# Patient Record
Sex: FEMALE | Race: WHITE | Employment: OTHER | URBAN - METROPOLITAN AREA
[De-identification: names, ages, dates, MRNs, and addresses within clinical notes are randomized per-mention and may not be internally consistent; named-entity substitution may affect disease eponyms.]

---

## 2019-04-06 ENCOUNTER — APPOINTMENT (OUTPATIENT)
Dept: GENERAL RADIOLOGY | Age: 74
End: 2019-04-06
Attending: EMERGENCY MEDICINE
Payer: MEDICARE

## 2019-04-06 ENCOUNTER — HOSPITAL ENCOUNTER (EMERGENCY)
Age: 74
Discharge: HOME | End: 2019-04-06
Attending: EMERGENCY MEDICINE
Payer: MEDICARE

## 2019-04-06 VITALS
SYSTOLIC BLOOD PRESSURE: 116 MMHG | TEMPERATURE: 98.3 F | WEIGHT: 165 LBS | DIASTOLIC BLOOD PRESSURE: 99 MMHG | OXYGEN SATURATION: 98 % | HEIGHT: 62 IN | BODY MASS INDEX: 30.36 KG/M2 | HEART RATE: 104 BPM | RESPIRATION RATE: 16 BRPM

## 2019-04-06 DIAGNOSIS — J45.909 MODERATE ASTHMA, UNSPECIFIED WHETHER COMPLICATED, UNSPECIFIED WHETHER PERSISTENT: Primary | ICD-10-CM

## 2019-04-06 LAB
ALBUMIN SERPL-MCNC: 3.6 G/DL (ref 3.4–5)
ALBUMIN/GLOB SERPL: 1.1 {RATIO} (ref 0.8–1.7)
ALP SERPL-CCNC: 97 U/L (ref 45–117)
ALT SERPL-CCNC: 26 U/L (ref 13–56)
ANION GAP SERPL CALC-SCNC: 8 MMOL/L (ref 3–18)
AST SERPL-CCNC: 19 U/L (ref 15–37)
BASOPHILS # BLD: 0 K/UL (ref 0–0.1)
BASOPHILS NFR BLD: 1 % (ref 0–2)
BILIRUB SERPL-MCNC: 0.5 MG/DL (ref 0.2–1)
BUN SERPL-MCNC: 15 MG/DL (ref 7–18)
BUN/CREAT SERPL: 13 (ref 12–20)
CALCIUM SERPL-MCNC: 8.8 MG/DL (ref 8.5–10.1)
CHLORIDE SERPL-SCNC: 109 MMOL/L (ref 100–108)
CK MB CFR SERPL CALC: NORMAL % (ref 0–4)
CK MB SERPL-MCNC: <1 NG/ML (ref 5–25)
CK SERPL-CCNC: 34 U/L (ref 26–192)
CO2 SERPL-SCNC: 24 MMOL/L (ref 21–32)
CREAT SERPL-MCNC: 1.12 MG/DL (ref 0.6–1.3)
DIFFERENTIAL METHOD BLD: ABNORMAL
EOSINOPHIL # BLD: 0.1 K/UL (ref 0–0.4)
EOSINOPHIL NFR BLD: 2 % (ref 0–5)
ERYTHROCYTE [DISTWIDTH] IN BLOOD BY AUTOMATED COUNT: 13.5 % (ref 11.6–14.5)
GLOBULIN SER CALC-MCNC: 3.4 G/DL (ref 2–4)
GLUCOSE SERPL-MCNC: 95 MG/DL (ref 74–99)
HCT VFR BLD AUTO: 42.4 % (ref 35–45)
HGB BLD-MCNC: 13.9 G/DL (ref 12–16)
LYMPHOCYTES # BLD: 1.7 K/UL (ref 0.9–3.6)
LYMPHOCYTES NFR BLD: 45 % (ref 21–52)
MCH RBC QN AUTO: 29.8 PG (ref 24–34)
MCHC RBC AUTO-ENTMCNC: 32.8 G/DL (ref 31–37)
MCV RBC AUTO: 91 FL (ref 74–97)
MONOCYTES # BLD: 0.2 K/UL (ref 0.05–1.2)
MONOCYTES NFR BLD: 4 % (ref 3–10)
NEUTS SEG # BLD: 1.9 K/UL (ref 1.8–8)
NEUTS SEG NFR BLD: 48 % (ref 40–73)
PLATELET # BLD AUTO: 157 K/UL (ref 135–420)
PMV BLD AUTO: 9.8 FL (ref 9.2–11.8)
POTASSIUM SERPL-SCNC: 3.7 MMOL/L (ref 3.5–5.5)
PROT SERPL-MCNC: 7 G/DL (ref 6.4–8.2)
RBC # BLD AUTO: 4.66 M/UL (ref 4.2–5.3)
SODIUM SERPL-SCNC: 141 MMOL/L (ref 136–145)
TROPONIN I SERPL-MCNC: <0.02 NG/ML (ref 0–0.04)
WBC # BLD AUTO: 3.8 K/UL (ref 4.6–13.2)

## 2019-04-06 PROCEDURE — 74011250637 HC RX REV CODE- 250/637: Performed by: EMERGENCY MEDICINE

## 2019-04-06 PROCEDURE — 94640 AIRWAY INHALATION TREATMENT: CPT

## 2019-04-06 PROCEDURE — 74011000250 HC RX REV CODE- 250: Performed by: EMERGENCY MEDICINE

## 2019-04-06 PROCEDURE — 80053 COMPREHEN METABOLIC PANEL: CPT

## 2019-04-06 PROCEDURE — 99285 EMERGENCY DEPT VISIT HI MDM: CPT

## 2019-04-06 PROCEDURE — 93005 ELECTROCARDIOGRAM TRACING: CPT

## 2019-04-06 PROCEDURE — 74011636637 HC RX REV CODE- 636/637: Performed by: EMERGENCY MEDICINE

## 2019-04-06 PROCEDURE — 85025 COMPLETE CBC W/AUTO DIFF WBC: CPT

## 2019-04-06 PROCEDURE — 77030013140 HC MSK NEB VYRM -A

## 2019-04-06 PROCEDURE — 82550 ASSAY OF CK (CPK): CPT

## 2019-04-06 PROCEDURE — 71045 X-RAY EXAM CHEST 1 VIEW: CPT

## 2019-04-06 RX ORDER — ALPRAZOLAM 1 MG/1
1 TABLET ORAL
Status: ON HOLD | COMMUNITY
End: 2021-06-28

## 2019-04-06 RX ORDER — ALBUTEROL SULFATE 90 UG/1
2 AEROSOL, METERED RESPIRATORY (INHALATION)
Qty: 1 INHALER | Refills: 0 | Status: SHIPPED | OUTPATIENT
Start: 2019-04-06 | End: 2019-04-11

## 2019-04-06 RX ORDER — PREDNISONE 50 MG/1
50 TABLET ORAL DAILY
Qty: 3 TAB | Refills: 0 | Status: SHIPPED | OUTPATIENT
Start: 2019-04-06 | End: 2019-04-09

## 2019-04-06 RX ORDER — LORAZEPAM 1 MG/1
1 TABLET ORAL ONCE
Status: COMPLETED | OUTPATIENT
Start: 2019-04-06 | End: 2019-04-06

## 2019-04-06 RX ORDER — ALBUTEROL SULFATE 90 UG/1
2 AEROSOL, METERED RESPIRATORY (INHALATION)
COMMUNITY
End: 2019-04-06

## 2019-04-06 RX ORDER — AMITRIPTYLINE HYDROCHLORIDE 100 MG/1
100 TABLET, FILM COATED ORAL
Status: ON HOLD | COMMUNITY
End: 2021-06-28

## 2019-04-06 RX ORDER — HYDROCODONE BITARTRATE AND ACETAMINOPHEN 5; 325 MG/1; MG/1
1 TABLET ORAL
Status: COMPLETED | OUTPATIENT
Start: 2019-04-06 | End: 2019-04-06

## 2019-04-06 RX ORDER — LORAZEPAM 1 MG/1
1 TABLET ORAL SEE ADMIN INSTRUCTIONS
Status: DISCONTINUED | OUTPATIENT
Start: 2019-04-06 | End: 2019-04-06

## 2019-04-06 RX ORDER — IPRATROPIUM BROMIDE AND ALBUTEROL SULFATE 2.5; .5 MG/3ML; MG/3ML
3 SOLUTION RESPIRATORY (INHALATION)
Status: COMPLETED | OUTPATIENT
Start: 2019-04-06 | End: 2019-04-06

## 2019-04-06 RX ADMIN — IPRATROPIUM BROMIDE AND ALBUTEROL SULFATE 3 ML: .5; 3 SOLUTION RESPIRATORY (INHALATION) at 12:02

## 2019-04-06 RX ADMIN — IPRATROPIUM BROMIDE AND ALBUTEROL SULFATE 3 ML: .5; 3 SOLUTION RESPIRATORY (INHALATION) at 12:09

## 2019-04-06 RX ADMIN — HYDROCODONE BITARTRATE AND ACETAMINOPHEN 1 TABLET: 5; 325 TABLET ORAL at 16:41

## 2019-04-06 RX ADMIN — PREDNISONE 50 MG: 20 TABLET ORAL at 16:42

## 2019-04-06 RX ADMIN — LORAZEPAM 1 MG: 1 TABLET ORAL at 16:51

## 2019-04-06 NOTE — ED NOTES
Pt states feeling better, family at bedside, call bell within reach, side rails up, no needs expressed at this time

## 2019-04-06 NOTE — ED NOTES
Pt states she is breathing easier, family states they have not heard any wheezing. Pt resting on stretcher, call bell within reach, side rails up, no needs expressed at this time

## 2019-04-06 NOTE — ED NOTES
Pt resting on stretcher, call bell within reach, side rails up, no needs expressed at this time, family remains at bedside.

## 2019-04-06 NOTE — DISCHARGE INSTRUCTIONS
Patient Education     Learning About Asthma Triggers  What are triggers? When you have asthma, certain things can make your symptoms worse. These are called triggers. They include:  · Cigarette smoke or air pollution. · Things you are allergic to, such as:  ¨ Pollen, mold, or dust mites. ¨ Pet hair, skin, or saliva. · Illnesses, like colds, flu, or pneumonia. · Exercise. · Dry, cold air. How do triggers affect asthma? Triggers can make it harder for your lungs to work as they should and can lead to sudden difficulty breathing and other symptoms. When you are around a trigger, an asthma attack is more likely. If your symptoms are severe, you may need emergency treatment or have to go to the hospital for treatment. If you know what your triggers are and can avoid them, you may be able to prevent asthma attacks, reduce how often you have them, and make them less severe. What can you do to avoid triggers? The first thing is to know your triggers. When you are having symptoms, note the things around you that might be causing them. Then look for patterns in what may be triggering your symptoms. Record your triggers on a piece of paper or in an asthma diary. When you have your list of possible triggers, work with your doctor to find ways to avoid them. You also can check how well your lungs are working by measuring your peak expiratory flow (PEF) throughout the day. Your PEF may drop when you are near things that trigger symptoms. Here are some ways to avoid a few common triggers. · Do not smoke or allow others to smoke around you. If you need help quitting, talk to your doctor about stop-smoking programs and medicines. These can increase your chances of quitting for good. · If there is a lot of pollution, pollen, or dust outside, stay at home and keep your windows closed. Use an air conditioner or air filter in your home.  Check your local weather report or newspaper for air quality and pollen reports. · Get a flu shot every year. Talk to your doctor about getting a pneumococcal shot. Wash your hands often to prevent infections. · Avoid exercising outdoors in cold weather. If you are outdoors in cold weather, wear a scarf around your face and breathe through your nose. How can you manage an asthma attack? · If you have an asthma action plan, follow the plan. In general:  ¨ Use your quick-relief inhaler as directed by your doctor. If your symptoms do not get better after you use your medicine, have someone take you to the emergency room. Call an ambulance if needed. ¨ If your doctor has given you other inhaled medicines or steroid pills, take them as directed. Where can you learn more? Go to Discomixdownload.com.be  Enter M564 in the search box to learn more about \"Learning About Asthma Triggers. \"   © 1879-7104 [employer]. Care instructions adapted under license by [facility] (which disclaims liability or warranty for this information). This care instruction is for use with your licensed healthcare professional. If you have questions about a medical condition or this instruction, always ask your healthcare professional. [place] [address] any warranty or liability for your use of this information. Content Version: 58.4.939839;  Last Revised: February 23, 2012

## 2019-04-06 NOTE — ED NOTES
Pt resting on stretcher, call bell within reach, family at bedside, no needs expressed at this time. Pt feeling better, talking with family

## 2019-04-06 NOTE — LETTER
[facility] [place] 
[facility] 
[place] [id number] 
[phone] Work/School Note Date: 4/6/2019 To Whom It May concern: 
 
Angelita Nunn was seen and treated today in the emergency room by the following provider(s): 
Attending Provider: Justine Lock MD.   
 
Darek Del Castillo was here with pt during entire stay in ED Sincerely, 
 
 
 
 
Linda Robertson RN

## 2019-04-06 NOTE — ED NOTES
Pt discharged per ambulatory with granddaughter, no acute distress on discharge, written isnt given to pt and granddaughter with rx x 2, verbalizes understanding Patient armband removed and shredded

## 2019-04-06 NOTE — ED TRIAGE NOTES
Pt arrived by EMS for sob, pt coughing on arrival, pt states she couldn't get a deep breath in, pt has hx of anxiety and appears anxious on arrival. Pt given neb tx en route, with little results. Pt has hx of asthma

## 2019-04-06 NOTE — ED PROVIDER NOTES
EMERGENCY DEPARTMENT HISTORY AND PHYSICAL EXAM 
 
Date: 4/6/2019 Patient Name: Leatha Downs History of Presenting Illness Chief Complaint Patient presents with  Cough History Provided By: Patient Leatha Downs is a 72 y.o. female with PMHX of asthma and seasonal allergies who presents to the emergency department C/O acute shortness of breath. Patient [place] come [place] she notes that the allergies have brought on her asthma. Today she had acute onset of shortness of breath and heaviness in her chest which she described as a tightness. EMS arrived and treated her with a DuoNeb which greatly reduced her symptoms. When patient arrived she was speaking in full sentences she notes that her symptoms have greatly improved but tightness is now a 2 out of 10 she denies headache, changes in vision, nausea, vomiting, diarrhea, urinary symptoms, numbness or tingling in hands or feet, fevers, chills. PCP: Leticia Gallegos MD 
 
Current Outpatient Medications Medication Sig Dispense Refill  ALPRAZolam (XANAX) 1 mg tablet Take 1 mg by mouth three (3) times daily as needed for Anxiety.  amitriptyline (ELAVIL) 100 mg tablet Take 100 mg by mouth nightly.  predniSONE (DELTASONE) 50 mg tablet Take 1 Tab by mouth daily for 3 days. 3 Tab 0  
 albuterol (PROVENTIL HFA, VENTOLIN HFA, PROAIR HFA) 90 mcg/actuation inhaler Take 2 Puffs by inhalation every four (4) hours as needed for Wheezing or Shortness of Breath for up to 5 days. Indications: Asthma Attack 1 Inhaler 0 Past History Past Medical History: 
Past Medical History:  
Diagnosis Date  Anxiety  Arthritis  Asthma   
 allergy related  Cancer ([facility] [address] lumpectomy lt breast  
 Depression  Stroke ([facility] [address] Past Surgical History: 
Past Surgical History:  
Procedure Laterality Date  BREAST SURGERY PROCEDURE UNLISTED    
 lumpectomy lt breast  
 HX APPENDECTOMY  HX CHOLECYSTECTOMY  HX DILATION AND CURETTAGE    
 HX GYN    
 HX HEENT    
 all of teeth removed  HX HYSTERECTOMY  HX ORTHOPAEDIC    
 total rt knee replacement  HX TUBAL LIGATION    
 HX WISDOM TEETH EXTRACTION Family History: 
History reviewed. No pertinent family history. Social History: 
Social History Tobacco Use  Smoking status: Former Smoker  Smokeless tobacco: Never Used Substance Use Topics  Alcohol use: Never Frequency: Never  Drug use: Never Allergies: Allergies Allergen Reactions  Iodinated Contrast- Oral And Iv Dye Anaphylaxis  Dilantin [Phenytoin Sodium Extended] Hives Review of Systems Review of Systems All other systems reviewed and otherwise negative except as noted in the HPI Physical Exam  
 
Vitals:  
 04/06/19 1630 04/06/19 1700 04/06/19 1730 04/06/19 1749 BP: (!) 123/101 119/76 (!) 116/99 (!) 116/99 Pulse: 98 92 93 (!) 104 Resp: 18 17 15 16 Temp:      
SpO2:    98% Weight:      
Height:      
 
Physical Exam 
 
Nursing notes and vital signs reviewed Constitutional: Non toxic appearing, no acute distress Head: Normocephalic, Atraumatic Eyes: Pupils are equal, round, and reactive to light, EOMI Neck: Supple Cardiovascular: Regular rate and rhythm, Chest: Normal work of breathing  and chest excursion bilaterally Lungs: Course and wheezing to ausculation bilaterally Abdomen: Soft, non tender, non distended Back: No evidence of trauma or deformity Extremities: No evidence of trauma or deformity, no LE edema Skin: Warm and dry, normal cap refill Neuro: Alert and appropriate Psychiatric: Normal mood and affect Diagnostic Study Results Labs - No results found for this or any previous visit (from the past 12 hour(s)). Radiologic Studies -  
XR CHEST PORT Final Result IMPRESSION:  
  
No active cardiopulmonary disease. CT Results  (Last 48 hours) None CXR Results  (Last 48 hours) 04/06/19 1139  XR CHEST PORT Final result Impression:  IMPRESSION:  
   
No active cardiopulmonary disease. Narrative:  EXAM: CHEST RADIOGRAPH, SINGLE VIEW CLINICAL INDICATION/HISTORY: cough COMPARISON: None. TECHNIQUE: Portable frontal view of the chest was obtained.   
   
_______________ FINDINGS:  
   
SUPPORT DEVICES: None. HEART AND MEDIASTINUM: Cardiomediastinal silhouette appears within normal  
limits. Tortuous and atherosclerotic thoracic aorta. No central vascular  
congestion. LUNGS AND PLEURAL SPACES: Lungs are well aerated with no confluent airspace  
opacity. No pleural effusion or pneumothorax. BONY THORAX AND SOFT TISSUES: No acute osseous abnormality. _______________ Medications given in the ED- Medications  
albuterol-ipratropium (DUO-NEB) 2.5 MG-0.5 MG/3 ML (3 mL Nebulization Given 4/6/19 1209) predniSONE (DELTASONE) tablet 50 mg (50 mg Oral Given 4/6/19 1642) HYDROcodone-acetaminophen (NORCO) 5-325 mg per tablet 1 Tab (1 Tab Oral Given 4/6/19 1641) LORazepam (ATIVAN) tablet 1 mg (1 mg Oral Given 4/6/19 1651) Medical Decision Making I am the first provider for this patient. I reviewed the vital signs, available nursing notes, past medical history, past surgical history, family history and social history. Vital Signs-Reviewed the patient's vital signs. Pulse Oximetry Analysis - 98% on ra Cardiac Monitor: 
Rate: 97 bpm 
Rhythm: nsr 
 
EKG interpretation: (Preliminary) EKG read by Gunner Grove MD 
 at 1642 No stemi normal sinus rhythm Records Reviewed: Nursing Notes, Old Medical Records and Previous electrocardiograms Provider Notes (Medical Decision Making): Adrienne Arshad is a 72 y.o. female with history of asthma who presented today with shortness of breath.   Patient was brought back and evaluated she noted that she had marked improvement with first DuoNeb therefore she was treated with 2 more lung sounds much less wheezy and no longer course. Patient breathing easily on room air. EKG showed no evidence of STEMI x-ray showed no acute cardiopulmonary process laboratory work as above notable for a negative troponin. Patient was treated with steroids. Patient did miss her daily medication of Ativan and Norco after which a slight tremor was no longer present in her hand. Patient will be discharged home with an inhaler and steroids follow-up with her PCM I explained the plan to the patient and family members and they agreed and understood. Procedures: 
Procedures Diagnosis and Disposition DISCHARGE NOTE: 
 
Angelita Nunn's  results have been reviewed with her. She has been counseled regarding her diagnosis, treatment, and plan. She verbally conveys understanding and agreement of the signs, symptoms, diagnosis, treatment and prognosis and additionally agrees to follow up as discussed. She also agrees with the care-plan and conveys that all of her questions have been answered. I have also provided discharge instructions for her that include: educational information regarding their diagnosis and treatment, and list of reasons why they would want to return to the ED prior to their follow-up appointment, should her condition change. She has been provided with education for proper emergency department utilization. CLINICAL IMPRESSION: 
 
1. Moderate asthma, unspecified whether complicated, unspecified whether persistent PLAN: 
1. D/C Home 2. Discharge Medication List as of 4/6/2019  5:27 PM  
  
START taking these medications Details  
predniSONE (DELTASONE) 50 mg tablet Take 1 Tab by mouth daily for 3 days. , Print, Disp-3 Tab, R-0  
  
  
CONTINUE these medications which have CHANGED  Details  
albuterol (PROVENTIL HFA, VENTOLIN HFA, PROAIR HFA) 90 mcg/actuation inhaler Take 2 Puffs by inhalation every four (4) hours as needed for Wheezing or Shortness of Breath for up to 5 days. Indications: Asthma Attack, Print, Disp-1 Inhaler, R-0  
  
  
CONTINUE these medications which have NOT CHANGED Details ALPRAZolam (XANAX) 1 mg tablet Take 1 mg by mouth three (3) times daily as needed for Anxiety. , Historical Med  
  
amitriptyline (ELAVIL) 100 mg tablet Take 100 mg by mouth nightly., Historical Med 3. Follow-up Information Follow up With Specialties Details Why Contact Info [facility] EMERGENCY DEPT Emergency Medicine  As needed [address] [id number] 
[phone]  
  
 
_______________________________ Please note that this dictation was completed with [email or web address], the computer voice recognition software. Quite often unanticipated grammatical, syntax, homophones, and other interpretive errors are inadvertently transcribed by the computer software. Please disregard these errors. Please excuse any errors that have escaped final proofreading.

## 2019-05-12 LAB
ATRIAL RATE: 95 BPM
CALCULATED P AXIS, ECG09: 63 DEGREES
CALCULATED R AXIS, ECG10: 13 DEGREES
CALCULATED T AXIS, ECG11: 53 DEGREES
DIAGNOSIS, 93000: NORMAL
P-R INTERVAL, ECG05: 180 MS
Q-T INTERVAL, ECG07: 392 MS
QRS DURATION, ECG06: 88 MS
QTC CALCULATION (BEZET), ECG08: 492 MS
VENTRICULAR RATE, ECG03: 95 BPM

## 2019-05-31 ENCOUNTER — APPOINTMENT (OUTPATIENT)
Dept: CT IMAGING | Age: 74
End: 2019-05-31
Attending: NURSE PRACTITIONER
Payer: MEDICARE

## 2019-05-31 ENCOUNTER — HOSPITAL ENCOUNTER (EMERGENCY)
Dept: CT IMAGING | Age: 74
Discharge: HOME | End: 2019-05-31
Attending: NURSE PRACTITIONER
Payer: MEDICARE

## 2019-05-31 ENCOUNTER — HOSPITAL ENCOUNTER (EMERGENCY)
Age: 74
Discharge: HOME | End: 2019-05-31
Attending: EMERGENCY MEDICINE | Admitting: EMERGENCY MEDICINE
Payer: MEDICARE

## 2019-05-31 ENCOUNTER — APPOINTMENT (OUTPATIENT)
Dept: GENERAL RADIOLOGY | Age: 74
End: 2019-05-31
Attending: NURSE PRACTITIONER
Payer: MEDICARE

## 2019-05-31 VITALS
HEIGHT: 62 IN | BODY MASS INDEX: 30.91 KG/M2 | DIASTOLIC BLOOD PRESSURE: 70 MMHG | SYSTOLIC BLOOD PRESSURE: 132 MMHG | WEIGHT: 168 LBS | HEART RATE: 82 BPM | TEMPERATURE: 98.3 F | RESPIRATION RATE: 18 BRPM | OXYGEN SATURATION: 100 %

## 2019-05-31 DIAGNOSIS — R05.9 COUGH: ICD-10-CM

## 2019-05-31 DIAGNOSIS — S09.90XA CLOSED HEAD INJURY, INITIAL ENCOUNTER: ICD-10-CM

## 2019-05-31 DIAGNOSIS — R51.9 ACUTE NONINTRACTABLE HEADACHE, UNSPECIFIED HEADACHE TYPE: ICD-10-CM
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
## 2019-05-31 PROCEDURE — 99283 EMERGENCY DEPT VISIT LOW MDM: CPT

## 2019-05-31 PROCEDURE — 70486 CT MAXILLOFACIAL W/O DYE: CPT

## 2019-05-31 PROCEDURE — 71046 X-RAY EXAM CHEST 2 VIEWS: CPT

## 2019-05-31 PROCEDURE — 72125 CT NECK SPINE W/O DYE: CPT

## 2019-05-31 PROCEDURE — 70450 CT HEAD/BRAIN W/O DYE: CPT

## 2019-05-31 PROCEDURE — 74011250637 HC RX REV CODE- 250/637: Performed by: NURSE PRACTITIONER

## 2019-05-31 RX ORDER — ACETAMINOPHEN 325 MG/1
650 TABLET ORAL
Status: COMPLETED | OUTPATIENT
Start: 2019-05-31 | End: 2019-05-31

## 2019-05-31 RX ADMIN — ACETAMINOPHEN 650 MG: 325 TABLET ORAL at 20:10

## 2019-05-31 NOTE — DISCHARGE INSTRUCTIONS
Follow-up as directed  Take Tylenol as needed for pain  Return to emergency department for severe headache, visual changes, neck pain, numbness or tingling, vomiting or worsening of symptoms    Patient Education        Cough: Care Instructions  Your Care Instructions    A cough is your body's response to something that bothers your throat or airways. Many things can cause a cough. You might cough because of a cold or the flu, bronchitis, or asthma. Smoking, postnasal drip, allergies, and stomach acid that backs up into your throat also can cause coughs. A cough is a symptom, not a disease. Most coughs stop when the cause, such as a cold, goes away. You can take a few steps at home to cough less and feel better. Follow-up care is a key part of your treatment and safety. Be sure to make and go to all appointments, and call your doctor if you are having problems. It's also a good idea to know your test results and keep a list of the medicines you take. How can you care for yourself at home? · Drink lots of water and other fluids. This helps thin the mucus and soothes a dry or sore throat. Honey or lemon juice in hot water or tea may ease a dry cough. · Take cough medicine as directed by your doctor. · Prop up your head on pillows to help you breathe and ease a dry cough. · Try cough drops to soothe a dry or sore throat. Cough drops don't stop a cough. Medicine-flavored cough drops are no better than candy-flavored drops or hard candy. · Do not smoke. Avoid secondhand smoke. If you need help quitting, talk to your doctor about stop-smoking programs and medicines. These can increase your chances of quitting for good. When should you call for help? Call 911 anytime you think you may need emergency care.  For example, call if:    · You have severe trouble breathing.    Call your doctor now or seek immediate medical care if:    · You cough up blood.     · You have new or worse trouble breathing.     · You have a new or higher fever.     · You have a new rash.    Watch closely for changes in your health, and be sure to contact your doctor if:    · You cough more deeply or more often, especially if you notice more mucus or a change in the color of your mucus.     · You have new symptoms, such as a sore throat, an earache, or sinus pain.     · You do not get better as expected. Where can you learn more? Go to http://carina-stacia.info/. Enter D279 in the search box to learn more about \"Cough: Care Instructions. \"  Current as of: September 5, 2018  Content Version: 11.9  © 0980-2330 [employer]. Care instructions adapted under license by [employer] (which disclaims liability or warranty for this information). If you have questions about a medical condition or this instruction, always ask your healthcare professional. [address] any warranty or liability for your use of this information.

## 2019-05-31 NOTE — ED PROVIDER NOTES
EMERGENCY DEPARTMENT HISTORY AND PHYSICAL EXAM    Date: 5/31/2019  Patient Name: Luis Fink    History of Presenting Illness     Chief Complaint   Patient presents with    Fall    Head Injury         History Provided By: Patient    Additional History (Context):   6:19 PM  Angelita Nunn is a [facility] y.o. female with PMHX anxiety, arthritis, asthma, cancer, depression, stroke presents to the ED c/o today after falling last night. The patient reports that she excellently tripped over a dog toy causing her to fall onto her left side of her head hitting the side of the couch. She denies any loss of consciousness but states that she was very dizzy afterwards. She states throughout the day her headache is gotten worse and is now an 8 out of 10 that is started on her left temporal and radiates the top of her head. She did not take any medications prior to arrival.  She does report last year she had a pretty severe concussion where she fractured multiple bones on the left side of her face. She also reports neck pain in the center but no radicular symptoms. Patient also reports that she has had a productive cough for the past couple weeks that she cannot get rid of. Pt denies current visual changes, numbness or tingling, chest pain, blood thinner use, fever, shortness of breath, vomiting, nausea, and any other sxs or complaints. PCP: Leticia Gallegos MD    Current Outpatient Medications   Medication Sig Dispense Refill    ALPRAZolam (XANAX) 1 mg tablet Take 1 mg by mouth three (3) times daily as needed for Anxiety.  amitriptyline (ELAVIL) 100 mg tablet Take 100 mg by mouth nightly.          Past History     Past Medical History:  Past Medical History:   Diagnosis Date    Anxiety     Arthritis     Asthma     allergy related    Cancer ([facility] [address]     lumpectomy lt breast    Depression     Stroke [facility])        Past Surgical History:  Past Surgical History:   Procedure Laterality Date    BREAST SURGERY PROCEDURE UNLISTED      lumpectomy lt breast    HX APPENDECTOMY      HX CHOLECYSTECTOMY      HX DILATION AND CURETTAGE      HX GYN      HX HEENT      all of teeth removed     HX HYSTERECTOMY      HX ORTHOPAEDIC      total rt knee replacement    HX TUBAL LIGATION      HX WISDOM TEETH EXTRACTION         Family History:  History reviewed. No pertinent family history. Social History:  Social History     Tobacco Use    Smoking status: Former Smoker    Smokeless tobacco: Never Used   Substance Use Topics    Alcohol use: Never     Frequency: Never    Drug use: Never       Allergies: Allergies   Allergen Reactions    Iodinated Contrast- Oral And Iv Dye Anaphylaxis    Dilantin [Phenytoin Sodium Extended] Hives       Review of Systems     Review of Systems   Constitutional: Negative for chills and fever. HENT: Negative for facial swelling. Respiratory: Positive for cough. Negative for chest tightness and shortness of breath. Cardiovascular: Negative for chest pain. Gastrointestinal: Negative for abdominal pain, diarrhea, nausea and vomiting. Genitourinary: Negative for dysuria. Musculoskeletal: Positive for neck pain. Skin: Negative for wound. Neurological: Positive for dizziness and headaches. Hematological: Does not bruise/bleed easily. All other systems reviewed and are negative. Physical Exam     Vitals:    05/31/19 1808 05/31/19 2017   BP: 110/69 132/70   Pulse: 89 82   Resp: 20 18   Temp: 98.8 °F (37.1 °C) 98.3 °F (36.8 °C)   SpO2: 100% 100%   Weight: 76.2 kg (168 lb)    Height: 5' 2\" (1.575 m)      Physical Exam   Constitutional: She is oriented to person, place, and time. She appears well-developed and well-nourished. Answering questions appropriately. HENT:   Head: Normocephalic and atraumatic.    Mild tenderness palpation left ocular without swelling or obvious deformity, able to perform EOMs without difficulty, no open area   Eyes: Pupils are equal, round, and reactive to light. Conjunctivae and EOM are normal.   Neck: Normal range of motion. Neck supple. Mild midline cervical tenderness palpation without step-off or deformity, able to perform range of motion without difficulty   Cardiovascular: Normal rate and regular rhythm. No murmur heard. Pulmonary/Chest: Effort normal and breath sounds normal.   Abdominal: Soft. Bowel sounds are normal. There is no tenderness. There is no rebound and no guarding. Musculoskeletal: Normal range of motion. Strong equal strength in bilateral upper and lower extremities. Denies paresthesia. Negative upper and lower pronator drift. No facial droop noted. Negative finger to nose. Neurological: She is alert and oriented to person, place, and time. No cranial nerve deficit. Coordination normal.   Skin: Skin is warm and dry. Nursing note and vitals reviewed. Diagnostic Study Results     Labs:   No results found for this or any previous visit (from the past 12 hour(s)). Radiologic Studies:     6:19 PM  RADIOLOGY FINDINGS      XR CHEST PA LAT   Final Result   IMPRESSION:      No acute cardiopulmonary process. CT SPINE CERV WO CONT   Final Result   IMPRESSION:      Mild degenerative changes without evidence of acute fracture or traumatic   subluxation of the cervical spine. Note that this cervical spine CT was performed supine. Although it is highly   sensitive for fractures, it does not evaluate for ligamentous injury or   instability. If the patient has persistent symptoms or if otherwise clinically   indicated, erect plain film evaluation or MRI should be performed. CT MAXILLOFACIAL WO CONT   Final Result   IMPRESSION:      1. No acute intracranial process, specifically, no evidence of intracranial   hemorrhage, mass effect, or midline shift. 2. No evidence of maxillofacial fracture. 3. Senescent changes of the brain including findings most suggestive of chronic   microvascular ischemia.       Please note that CT may be negative in the setting of acute infarction. CT HEAD WO CONT   Final Result   IMPRESSION:      1. No acute intracranial process, specifically, no evidence of intracranial   hemorrhage, mass effect, or midline shift. 2. No evidence of maxillofacial fracture. 3. Senescent changes of the brain including findings most suggestive of chronic   microvascular ischemia. Please note that CT may be negative in the setting of acute infarction. CT Results  (Last 48 hours)               05/31/19 1932  CT SPINE CERV WO CONT Final result    Impression:  IMPRESSION:       Mild degenerative changes without evidence of acute fracture or traumatic   subluxation of the cervical spine. Note that this cervical spine CT was performed supine. Although it is highly   sensitive for fractures, it does not evaluate for ligamentous injury or   instability. If the patient has persistent symptoms or if otherwise clinically   indicated, erect plain film evaluation or MRI should be performed. Narrative:  EXAM: CT cervical spine       CLINICAL INDICATION/HISTORY:  Neck pain following trauma. COMPARISON: None. TECHNIQUE: Axial CT imaging of the cervical spine was performed from the skull   base to the thoracic inlet without intravenous contrast. Multiplanar reformats   were generated. One or more dose reduction techniques were used on this CT: automated exposure   control, adjustment of the mAs and/or kVp according to patient size, and   iterative reconstruction techniques. The specific techniques used on this CT   exam have been documented in the patient's electronic medical record. Digital   Imaging and Communications in Medicine (DICOM) format image data are available   to nonaffiliated external healthcare facilities or entities on a secure, media   free, reciprocally searchable basis with patient authorization for at least a   12-month period after this study.        _______________ FINDINGS:       VERTEBRAE AND DISCS: There is no acute fracture in the cervical spine,   specifically, vertebral body heights are maintained. Mild spondylosis at C4-C5   There is no spondylolisthesis. There are no significant areas of bone lucency or   sclerosis. SPINAL CANAL AND FORAMINA: Patent without significant bony canal or foramina   encroachment. PREVERTEBRAL SOFT TISSUES: Normal.       VISIBLE PORTIONS OF POSTERIOR FOSSA/BRAIN: Normal.       LUNG APICES: Clear. OTHER: None.       _______________           05/31/19 1932  CT MAXILLOFACIAL WO CONT Final result    Impression:  IMPRESSION:       1. No acute intracranial process, specifically, no evidence of intracranial   hemorrhage, mass effect, or midline shift. 2. No evidence of maxillofacial fracture. 3. Senescent changes of the brain including findings most suggestive of chronic   microvascular ischemia. Please note that CT may be negative in the setting of acute infarction. Narrative:  EXAM:    CT - Head   CT - Maxillofacial       CLINICAL INDICATION/HISTORY: Left eye pain and headache following trauma. COMPARISON: None. TECHNIQUE: Axial CT imaging of the head was performed without intravenous   contrast. Additionally, thin section axial CT through the maxillofacial   structures was performed with coronal and sagittal reconstructions. One or more dose reduction techniques were used on this CT: automated exposure   control, adjustment of the mAs and/or kVp according to patient size, and   iterative reconstruction techniques. The specific techniques used on this CT   exam have been documented in the patient's electronic medical record.   Digital   Imaging and Communications in Medicine (DICOM) format image data are available   to nonaffiliated external healthcare facilities or entities on a secure, media   free, reciprocally searchable basis with patient authorization for at least a   12-month period after this study. _______________       FINDINGS:       BRAIN AND POSTERIOR FOSSA: There is no intracranial hemorrhage, mass effect, or   midline shift. There is hypoattenuation of the periventricular white matter,   which is nonspecific but most likely represents changes from chronic   microangiopathy. Gray-white differentiation is maintained. EXTRA-AXIAL SPACES AND MENINGES: There are no abnormal extra-axial fluid   collections. CALVARIUM: Intact. SINUSES: Clear. MAXILLOFACIAL STRUCTURES:  No fractures are evident. Patient is edentulous. The   mandible is intact. There is no dislocation at the temporomandibular joints. The   globes are intact.     _______________           05/31/19 1931  CT HEAD WO CONT Final result    Impression:  IMPRESSION:       1. No acute intracranial process, specifically, no evidence of intracranial   hemorrhage, mass effect, or midline shift. 2. No evidence of maxillofacial fracture. 3. Senescent changes of the brain including findings most suggestive of chronic   microvascular ischemia. Please note that CT may be negative in the setting of acute infarction. Narrative:  EXAM:    CT - Head   CT - Maxillofacial       CLINICAL INDICATION/HISTORY: Left eye pain and headache following trauma. COMPARISON: None. TECHNIQUE: Axial CT imaging of the head was performed without intravenous   contrast. Additionally, thin section axial CT through the maxillofacial   structures was performed with coronal and sagittal reconstructions. One or more dose reduction techniques were used on this CT: automated exposure   control, adjustment of the mAs and/or kVp according to patient size, and   iterative reconstruction techniques. The specific techniques used on this CT   exam have been documented in the patient's electronic medical record.   Digital   Imaging and Communications in Medicine (DICOM) format image data are available   to nonaffiliated external healthcare facilities or entities on a secure, media   free, reciprocally searchable basis with patient authorization for at least a   12-month period after this study. _______________       FINDINGS:       BRAIN AND POSTERIOR FOSSA: There is no intracranial hemorrhage, mass effect, or   midline shift. There is hypoattenuation of the periventricular white matter,   which is nonspecific but most likely represents changes from chronic   microangiopathy. Gray-white differentiation is maintained. EXTRA-AXIAL SPACES AND MENINGES: There are no abnormal extra-axial fluid   collections. CALVARIUM: Intact. SINUSES: Clear. MAXILLOFACIAL STRUCTURES:  No fractures are evident. Patient is edentulous. The   mandible is intact. There is no dislocation at the temporomandibular joints. The   globes are intact.     _______________               CXR Results  (Last 48 hours)               05/31/19 1940  XR CHEST PA LAT Final result    Impression:  IMPRESSION:       No acute cardiopulmonary process. Narrative:  EXAM: 2 view chest x-ray       CLINICAL INDICATION/HISTORY: Cough. COMPARISON: 04/06/2019. TECHNIQUE: Frontal and lateral views of the chest were obtained.       _______________       FINDINGS:       HEART, VESSELS, MEDIASTINUM: Heart size is normal. No vascular congestion. LUNGS, PLEURAL SPACES: The lungs are clear. No effusion or pneumothorax. BONY THORAX, SOFT TISSUES: Unremarkable.       _______________                 Medical Decision Making     I am the first provider for this patient. I reviewed the vital signs, available nursing notes, past medical history, past surgical history, family history and social history. Vital Signs: Reviewed the patient's vital signs.     Pulse Oximetry Analysis: 100% on RA    Records Reviewed: REVIEWED OLD RECORDS AND NURSING NOTES    Procedures:  Procedures    ED Course:  6:19 PM: Initial Contact     7:52 PM: Patient updated on all results, they understand reasons to return and are offering no further questions or concerns at this time. Provider Notes (Medical Decision Making): Patient presents emergency department complaining of head and neck pain after mechanical fall yesterday. She denies loss of consciousness but states that her headache is becoming worse. The patient is not on any blood thinners. CT of the head neck and maxillofacial show no acute process. Will discharge with Tylenol for pain as well as strict return precautions early PCP and neurology follow-up as needed. She has no neuro focal deficit on exam.  She Denies any radicular symptoms. Pt understands reasons to return and is offering no questions or concerns. Diagnosis and Disposition     Discharge Note:  7:52 PM:  Angelita Nunn's  results have been reviewed with her. She has been counseled regarding her diagnosis, treatment, and plan. She verbally conveys understanding and agreement of the signs, symptoms, diagnosis, treatment and prognosis and additionally agrees to follow up as discussed. She also agrees with the care-plan and conveys that all of her questions have been answered. I have also provided discharge instructions for her that include: educational information regarding their diagnosis and treatment, and list of reasons why they would want to return to the ED prior to their follow-up appointment, should her condition change. She has been provided with education for proper emergency department utilization. Clinical Impression:  1. Fall, initial encounter    2. Closed head injury, initial encounter    3. Acute nonintractable headache, unspecified headache type    4. Cough        Plan:  1. D/C Home  2. Discharge Medication List as of 5/31/2019  7:51 PM        3.    Follow-up Information     Follow up With Specialties Details Why Contact Info    Carmen Farias MD Internal Medicine Schedule an appointment as soon as possible for a visit in 2 days or your PCP [address] [facility] [address]      Rosa Isela Marquez MD Neurology Schedule an appointment as soon as possible for a visit in 1 week  [address]  [address] [facility] [address]  [phone] [address] [facility] DEPT Emergency Medicine  As needed, If symptoms worsen [address] [place] [id number]  [phone]          Please note that this dictation was completed with [employer], the [email or web address] voice recognition software. Quite often unanticipated grammatical, syntax, homophones, and other interpretive errors are inadvertently transcribed by the computer software. Please disregard these errors. Please excuse any errors that have escaped final proofreading.     BRANDON Kinsey-BC

## 2019-05-31 NOTE — ED TRIAGE NOTES
Patient states that she fell last night hitting her head on the edge of her couch. Patient with complaints of left sided head pain that radiates to the top of head.

## 2019-06-02 ENCOUNTER — APPOINTMENT (OUTPATIENT)
Dept: VASCULAR SURGERY | Age: 74
End: 2019-06-02
Attending: EMERGENCY MEDICINE
Payer: MEDICARE

## 2019-06-02 ENCOUNTER — HOSPITAL ENCOUNTER (EMERGENCY)
Age: 74
Discharge: HOME | End: 2019-06-02
Attending: EMERGENCY MEDICINE
Payer: MEDICARE

## 2019-06-02 VITALS
TEMPERATURE: 98.7 F | DIASTOLIC BLOOD PRESSURE: 59 MMHG | RESPIRATION RATE: 16 BRPM | WEIGHT: 172 LBS | HEART RATE: 81 BPM | OXYGEN SATURATION: 100 % | BODY MASS INDEX: 31.65 KG/M2 | SYSTOLIC BLOOD PRESSURE: 119 MMHG | HEIGHT: 62 IN

## 2019-06-02 DIAGNOSIS — R60.0 BILATERAL LEG EDEMA: Primary | ICD-10-CM

## 2019-06-02 DIAGNOSIS — N30.00 ACUTE CYSTITIS WITHOUT HEMATURIA: ICD-10-CM

## 2019-06-02 LAB
APPEARANCE UR: CLEAR
BACTERIA URNS QL MICRO: ABNORMAL /HPF
BILIRUB UR QL: NEGATIVE
COLOR UR: YELLOW
EPITH CASTS URNS QL MICRO: ABNORMAL /LPF (ref 0–5)
GLUCOSE UR STRIP.AUTO-MCNC: NEGATIVE MG/DL
HGB UR QL STRIP: NEGATIVE
KETONES UR QL STRIP.AUTO: ABNORMAL MG/DL
LEUKOCYTE ESTERASE UR QL STRIP.AUTO: ABNORMAL
MUCOUS THREADS URNS QL MICRO: ABNORMAL /LPF
NITRITE UR QL STRIP.AUTO: NEGATIVE
PH UR STRIP: 5 [PH] (ref 5–8)
PROT UR STRIP-MCNC: NEGATIVE MG/DL
RBC #/AREA URNS HPF: NEGATIVE /HPF (ref 0–5)
SP GR UR REFRACTOMETRY: 1.03 (ref 1–1.03)
UROBILINOGEN UR QL STRIP.AUTO: 0.2 EU/DL (ref 0.2–1)
WBC URNS QL MICRO: ABNORMAL /HPF (ref 0–5)

## 2019-06-02 PROCEDURE — 81001 URINALYSIS AUTO W/SCOPE: CPT

## 2019-06-02 PROCEDURE — 87086 URINE CULTURE/COLONY COUNT: CPT

## 2019-06-02 PROCEDURE — 93970 EXTREMITY STUDY: CPT

## 2019-06-02 PROCEDURE — 99283 EMERGENCY DEPT VISIT LOW MDM: CPT

## 2019-06-02 RX ORDER — CEPHALEXIN 500 MG/1
500 CAPSULE ORAL 2 TIMES DAILY
Qty: 14 CAP | Refills: 0 | Status: SHIPPED | OUTPATIENT
Start: 2019-06-02 | End: 2019-06-09

## 2019-06-02 RX ORDER — ESCITALOPRAM OXALATE 20 MG/1
20 TABLET ORAL DAILY
COMMUNITY

## 2019-06-02 RX ORDER — GABAPENTIN 300 MG/1
400 CAPSULE ORAL 3 TIMES DAILY
COMMUNITY

## 2019-06-02 RX ORDER — BUPROPION HYDROCHLORIDE 150 MG/1
150 TABLET ORAL
Status: ON HOLD | COMMUNITY
End: 2021-06-28

## 2019-06-02 NOTE — ED PROVIDER NOTES
EMERGENCY DEPARTMENT HISTORY AND PHYSICAL EXAM    Date: 6/2/2019  Patient Name: Chava Nunn    History of Presenting Illness     Chief Complaint   Patient presents with    Rash         History Provided By: Patient    5:13 PM  Angelita Nunn is a 72 y.o. female who presents to the emergency department C/O lower extremity edema, burning pain, and outpouching that she noticed today when she was in a dressing room at a retail store. She states she only notices the outpouching when she stands up and has never seen it before today. She states the burning pain is also new today. She denies chest pain or shortness of breath. She does endorse decreasing urine output is also and is also concerned she might have a UTI. No other complaints. PCP: Leticia Gallegos MD    Current Outpatient Medications   Medication Sig Dispense Refill    escitalopram oxalate (LEXAPRO) 20 mg tablet Take 20 mg by mouth daily.  buPROPion XL (WELLBUTRIN XL) 150 mg tablet Take 150 mg by mouth every morning.  gabapentin (NEURONTIN) 300 mg capsule Take 400 mg by mouth three (3) times daily.  cephALEXin (KEFLEX) 500 mg capsule Take 1 Cap by mouth two (2) times a day for 7 days. 14 Cap 0    ALPRAZolam (XANAX) 1 mg tablet Take 1 mg by mouth three (3) times daily as needed for Anxiety.  amitriptyline (ELAVIL) 100 mg tablet Take 100 mg by mouth nightly.          Past History     Past Medical History:  Past Medical History:   Diagnosis Date    Anxiety     Arthritis     Asthma     allergy related    Cancer ([facility] [address]     lumpectomy lt breast    Depression     Stroke [facility])        Past Surgical History:  Past Surgical History:   Procedure Laterality Date    BREAST SURGERY PROCEDURE UNLISTED      lumpectomy lt breast    HX APPENDECTOMY      HX CHOLECYSTECTOMY      HX DILATION AND CURETTAGE      HX GYN      HX HEENT      all of teeth removed     HX HYSTERECTOMY      HX ORTHOPAEDIC      total rt knee replacement    HX TUBAL LIGATION      HX WISDOM TEETH EXTRACTION         Family History:  No family history on file. Social History:  Social History     Tobacco Use    Smoking status: Former Smoker    Smokeless tobacco: Never Used   Substance Use Topics    Alcohol use: Never     Frequency: Never    Drug use: Never       Allergies: Allergies   Allergen Reactions    Iodinated Contrast- Oral And Iv Dye Anaphylaxis    Dilantin [Phenytoin Sodium Extended] Hives         Review of Systems   Review of Systems   Constitutional: Negative for fever. Respiratory: Negative for shortness of breath. Cardiovascular: Positive for leg swelling. Negative for chest pain. Genitourinary: Positive for decreased urine volume. All other systems reviewed and are negative.         Physical Exam     Vitals:    06/02/19 1658   BP: (!) 124/93   Pulse: 98   Resp: 16   Temp: 98.7 °F (37.1 °C)   SpO2: 95%   Weight: 78 kg (172 lb)   Height: 5' 2\" (1.575 m)     Physical Exam    Nursing notes and vital signs reviewed    Constitutional: Non toxic appearing, no acute distress  Head: Normocephalic, Atraumatic  Eyes: EOMI  Neck: Supple  Chest: Normal work of breathing and chest excursion bilaterally  Back: No evidence of trauma or deformity  Extremities: No evidence of trauma or deformity, moderate bilateral LE edema  Skin: Warm and dry, normal cap refill  Neuro: Alert and appropriate  Psychiatric: Anxious mood and affect      Diagnostic Study Results     Labs -     Recent Results (from the past 12 hour(s))   URINALYSIS W/ RFLX MICROSCOPIC    Collection Time: 06/02/19  5:30 PM   Result Value Ref Range    Color YELLOW      Appearance CLEAR      Specific gravity 1.027 1.005 - 1.030      pH (UA) 5.0 5.0 - 8.0      Protein NEGATIVE  NEG mg/dL    Glucose NEGATIVE  NEG mg/dL    Ketone TRACE (A) NEG mg/dL    Bilirubin NEGATIVE  NEG      Blood NEGATIVE  NEG      Urobilinogen 0.2 0.2 - 1.0 EU/dL    Nitrites NEGATIVE  NEG      Leukocyte Esterase MODERATE (A) NEG URINE MICROSCOPIC ONLY    Collection Time: 06/02/19  5:30 PM   Result Value Ref Range    WBC 21 to 30 0 - 5 /hpf    RBC NEGATIVE  0 - 5 /hpf    Epithelial cells 1+ 0 - 5 /lpf    Bacteria 1+ (A) NEG /hpf    Mucus 1+ (A) NEG /lpf       Radiologic Studies -   No orders to display     CT Results  (Last 48 hours)               05/31/19 1932  CT SPINE CERV WO CONT Final result    Impression:  IMPRESSION:       Mild degenerative changes without evidence of acute fracture or traumatic   subluxation of the cervical spine. Note that this cervical spine CT was performed supine. Although it is highly   sensitive for fractures, it does not evaluate for ligamentous injury or   instability. If the patient has persistent symptoms or if otherwise clinically   indicated, erect plain film evaluation or MRI should be performed. Narrative:  EXAM: CT cervical spine       CLINICAL INDICATION/HISTORY:  Neck pain following trauma. COMPARISON: None. TECHNIQUE: Axial CT imaging of the cervical spine was performed from the skull   base to the thoracic inlet without intravenous contrast. Multiplanar reformats   were generated. One or more dose reduction techniques were used on this CT: automated exposure   control, adjustment of the mAs and/or kVp according to patient size, and   iterative reconstruction techniques. The specific techniques used on this CT   exam have been documented in the patient's electronic medical record. Digital   Imaging and Communications in Medicine (DICOM) format image data are available   to nonaffiliated external healthcare facilities or entities on a secure, media   free, reciprocally searchable basis with patient authorization for at least a   12-month period after this study. _______________       FINDINGS:       VERTEBRAE AND DISCS: There is no acute fracture in the cervical spine,   specifically, vertebral body heights are maintained.  Mild spondylosis at C4-C5   There is no spondylolisthesis. There are no significant areas of bone lucency or   sclerosis. SPINAL CANAL AND FORAMINA: Patent without significant bony canal or foramina   encroachment. PREVERTEBRAL SOFT TISSUES: Normal.       VISIBLE PORTIONS OF POSTERIOR FOSSA/BRAIN: Normal.       LUNG APICES: Clear. OTHER: None.       _______________           05/31/19 1932  CT MAXILLOFACIAL WO CONT Final result    Impression:  IMPRESSION:       1. No acute intracranial process, specifically, no evidence of intracranial   hemorrhage, mass effect, or midline shift. 2. No evidence of maxillofacial fracture. 3. Senescent changes of the brain including findings most suggestive of chronic   microvascular ischemia. Please note that CT may be negative in the setting of acute infarction. Narrative:  EXAM:    CT - Head   CT - Maxillofacial       CLINICAL INDICATION/HISTORY: Left eye pain and headache following trauma. COMPARISON: None. TECHNIQUE: Axial CT imaging of the head was performed without intravenous   contrast. Additionally, thin section axial CT through the maxillofacial   structures was performed with coronal and sagittal reconstructions. One or more dose reduction techniques were used on this CT: automated exposure   control, adjustment of the mAs and/or kVp according to patient size, and   iterative reconstruction techniques. The specific techniques used on this CT   exam have been documented in the patient's electronic medical record. Digital   Imaging and Communications in Medicine (DICOM) format image data are available   to nonaffiliated external healthcare facilities or entities on a secure, media   free, reciprocally searchable basis with patient authorization for at least a   12-month period after this study. _______________       FINDINGS:       BRAIN AND POSTERIOR FOSSA: There is no intracranial hemorrhage, mass effect, or   midline shift.  There is hypoattenuation of the periventricular white matter,   which is nonspecific but most likely represents changes from chronic   microangiopathy. Gray-white differentiation is maintained. EXTRA-AXIAL SPACES AND MENINGES: There are no abnormal extra-axial fluid   collections. CALVARIUM: Intact. SINUSES: Clear. MAXILLOFACIAL STRUCTURES:  No fractures are evident. Patient is edentulous. The   mandible is intact. There is no dislocation at the temporomandibular joints. The   globes are intact.     _______________           05/31/19 1931  CT HEAD WO CONT Final result    Impression:  IMPRESSION:       1. No acute intracranial process, specifically, no evidence of intracranial   hemorrhage, mass effect, or midline shift. 2. No evidence of maxillofacial fracture. 3. Senescent changes of the brain including findings most suggestive of chronic   microvascular ischemia. Please note that CT may be negative in the setting of acute infarction. Narrative:  EXAM:    CT - Head   CT - Maxillofacial       CLINICAL INDICATION/HISTORY: Left eye pain and headache following trauma. COMPARISON: None. TECHNIQUE: Axial CT imaging of the head was performed without intravenous   contrast. Additionally, thin section axial CT through the maxillofacial   structures was performed with coronal and sagittal reconstructions. One or more dose reduction techniques were used on this CT: automated exposure   control, adjustment of the mAs and/or kVp according to patient size, and   iterative reconstruction techniques. The specific techniques used on this CT   exam have been documented in the patient's electronic medical record. Digital   Imaging and Communications in Medicine (DICOM) format image data are available   to nonaffiliated external healthcare facilities or entities on a secure, media   free, reciprocally searchable basis with patient authorization for at least a   12-month period after this study. _______________       FINDINGS:       BRAIN AND POSTERIOR FOSSA: There is no intracranial hemorrhage, mass effect, or   midline shift. There is hypoattenuation of the periventricular white matter,   which is nonspecific but most likely represents changes from chronic   microangiopathy. Gray-white differentiation is maintained. EXTRA-AXIAL SPACES AND MENINGES: There are no abnormal extra-axial fluid   collections. CALVARIUM: Intact. SINUSES: Clear. MAXILLOFACIAL STRUCTURES:  No fractures are evident. Patient is edentulous. The   mandible is intact. There is no dislocation at the temporomandibular joints. The   globes are intact.     _______________               CXR Results  (Last 48 hours)               05/31/19 1940  XR CHEST PA LAT Final result    Impression:  IMPRESSION:       No acute cardiopulmonary process. Narrative:  EXAM: 2 view chest x-ray       CLINICAL INDICATION/HISTORY: Cough. COMPARISON: 04/06/2019. TECHNIQUE: Frontal and lateral views of the chest were obtained.       _______________       FINDINGS:       HEART, VESSELS, MEDIASTINUM: Heart size is normal. No vascular congestion. LUNGS, PLEURAL SPACES: The lungs are clear. No effusion or pneumothorax. BONY THORAX, SOFT TISSUES: Unremarkable.       _______________                 Medications given in the ED-  Medications - No data to display      Medical Decision Making   I am the first provider for this patient. I reviewed the vital signs, available nursing notes, past medical history, past surgical history, family history and social history. Vital Signs-Reviewed the patient's vital signs. Records Reviewed: Nursing Notes and Old Medical Records    Provider Notes (Medical Decision Making): Joss Garvin is a 72 y.o. female presenting for burning pain and outpouching on her legs. Exam consistent with likely cellulitis, no DVT on Doppler. UTI on UA, culture sent.   Will provide antibiotics and discharge with PCP follow-up and return precautions. Patient understands and agrees with this plan. Procedures:  Procedures    ED Course:   6:41 PM  Updated on all results and plan and all questions answered      Diagnosis and Disposition       DISCHARGE NOTE:    Angelita Nunn's  results have been reviewed with her. She has been counseled regarding her diagnosis, treatment, and plan. She verbally conveys understanding and agreement of the signs, symptoms, diagnosis, treatment and prognosis and additionally agrees to follow up as discussed. She also agrees with the care-plan and conveys that all of her questions have been answered. I have also provided discharge instructions for her that include: educational information regarding their diagnosis and treatment, and list of reasons why they would want to return to the ED prior to their follow-up appointment, should her condition change. She has been provided with education for proper emergency department utilization. CLINICAL IMPRESSION:    1. Bilateral leg edema    2. Acute cystitis without hematuria        PLAN:  1. D/C Home  2. Current Discharge Medication List      START taking these medications    Details   cephALEXin (KEFLEX) 500 mg capsule Take 1 Cap by mouth two (2) times a day for 7 days. Qty: 14 Cap, Refills: 0           3. Follow-up Information     Follow up With Specialties Details Why Contact Info    Yessy Elkins, DO Internal Medicine Schedule an appointment as soon as possible for a visit Or Your Primary Care Doctor [address] [facility]      THE [facility] EMERGENCY DEPT Emergency Medicine  If symptoms worsen [address] [id number]  [phone]        _______________________________      Please note that this dictation was completed with [employer], the [employer] voice recognition software.   Quite often unanticipated grammatical, syntax, homophones, and other interpretive errors are inadvertently transcribed by the computer software. Please disregard these errors. Please excuse any errors that have escaped final proofreading.

## 2019-06-02 NOTE — DISCHARGE INSTRUCTIONS
Patient Education        Leg and Ankle Edema: Care Instructions  Your Care Instructions  Swelling in the legs, ankles, and feet is called edema. It is common after you sit or stand for a while. Long plane flights or car rides often cause swelling in the legs and feet. You may also have swelling if you have to stand for long periods of time at your job. Problems with the veins in the legs (varicose veins) and changes in hormones can also cause swelling. Sometimes the swelling in the ankles and feet is caused by a more serious problem, such as heart failure, infection, blood clots, or liver or kidney disease. Follow-up care is a key part of your treatment and safety. Be sure to make and go to all appointments, and call your doctor if you are having problems. It's also a good idea to know your test results and keep a list of the medicines you take. How can you care for yourself at home? · If your doctor gave you medicine, take it as prescribed. Call your doctor if you think you are having a problem with your medicine. · Whenever you are resting, raise your legs up. Try to keep the swollen area higher than the level of your heart. · Take breaks from standing or sitting in one position. ? Walk around to increase the blood flow in your lower legs. ? Move your feet and ankles often while you stand, or tighten and relax your leg muscles. · Wear support stockings. Put them on in the morning, before swelling gets worse. · Eat a balanced diet. Lose weight if you need to. · Limit the amount of salt (sodium) in your diet. Salt holds fluid in the body and may increase swelling. When should you call for help? Call 911 anytime you think you may need emergency care. For example, call if:    · You have symptoms of a blood clot in your lung (called a pulmonary embolism). These may include:  ? Sudden chest pain. ? Trouble breathing. ?  Coughing up blood.    Call your doctor now or seek immediate medical care if:    · You have signs of a blood clot, such as:  ? Pain in your calf, back of the knee, thigh, or groin. ? Redness and swelling in your leg or groin.     · You have symptoms of infection, such as:  ? Increased pain, swelling, warmth, or redness. ? Red streaks or pus. ? A fever.    Watch closely for changes in your health, and be sure to contact your doctor if:    · Your swelling is getting worse.     · You have new or worsening pain in your legs.     · You do not get better as expected. Where can you learn more? Go to http://carina-stacia.info/. Enter D164 in the search box to learn more about \"Leg and Ankle Edema: Care Instructions. \"  Current as of: September 23, 2018  Content Version: 11.9  © 7988-1342 [employer]. Care instructions adapted under license by [employer] (which disclaims liability or warranty for this information). If you have questions about a medical condition or this instruction, always ask your healthcare professional. [place] [address] any warranty or liability for your use of this information. Patient Education        Urinary Tract Infection in Women: Care Instructions  Your Care Instructions    A urinary tract infection, or UTI, is a general term for an infection anywhere between the kidneys and the urethra (where urine comes out). Most UTIs are bladder infections. They often cause pain or burning when you urinate. UTIs are caused by bacteria and can be cured with antibiotics. Be sure to complete your treatment so that the infection goes away. Follow-up care is a key part of your treatment and safety. Be sure to make and go to all appointments, and call your doctor if you are having problems. It's also a good idea to know your test results and keep a list of the medicines you take. How can you care for yourself at home? · Take your antibiotics as directed. Do not stop taking them just because you feel better.  You need to take the full course of antibiotics. · Drink extra water and other fluids for the next day or two. This may help wash out the bacteria that are causing the infection. (If you have kidney, heart, or liver disease and have to limit fluids, talk with your doctor before you increase your fluid intake.)  · Avoid drinks that are carbonated or have caffeine. They can irritate the bladder. · Urinate often. Try to empty your bladder each time. · To relieve pain, take a hot bath or lay a heating pad set on low over your lower belly or genital area. Never go to sleep with a heating pad in place. To prevent UTIs  · Drink plenty of water each day. This helps you urinate often, which clears bacteria from your system. (If you have kidney, heart, or liver disease and have to limit fluids, talk with your doctor before you increase your fluid intake.)  · Urinate when you need to. · Urinate right after you have sex. · Change sanitary pads often. · Avoid douches, bubble baths, feminine hygiene sprays, and other feminine hygiene products that have deodorants. · After going to the bathroom, wipe from front to back. When should you call for help? Call your doctor now or seek immediate medical care if:    · Symptoms such as fever, chills, nausea, or vomiting get worse or appear for the first time.     · You have new pain in your back just below your rib cage. This is called flank pain.     · There is new blood or pus in your urine.     · You have any problems with your antibiotic medicine.    Watch closely for changes in your health, and be sure to contact your doctor if:    · You are not getting better after taking an antibiotic for 2 days.     · Your symptoms go away but then come back. Where can you learn more? Go to http://carina-stacia.info/. Enter K584 in the search box to learn more about \"Urinary Tract Infection in Women: Care Instructions. \"  Current as of: March 20, 2018  Content Version: 11.9  © 4736-1074 [employer]. Care instructions adapted under license by [employer] (which disclaims liability or warranty for this information). If you have questions about a medical condition or this instruction, always ask your healthcare professional. [place] [address] any warranty or liability for your use of this information.

## 2019-06-02 NOTE — ED TRIAGE NOTES
Pt states she felt her legs burning today, pt reports when she looked at her thighs she noticed what appears to be \"pockets of fluid\",

## 2019-06-02 NOTE — ED NOTES
Patient states she started having neuropathy like pains at approximately 1300. Patient states she feels like there is a \"TENS unit on her and turned up as high as it can go. \" Patient also c/o \"pockets\" in her legs that feel like they're burning. No pockets or rash noted on inspection, although indentations are noted in the skin of the thighs bilaterally when patient stands.

## 2019-06-04 LAB
BACTERIA SPEC CULT: NORMAL
SERVICE CMNT-IMP: NORMAL

## 2020-03-01 ENCOUNTER — APPOINTMENT (OUTPATIENT)
Dept: GENERAL RADIOLOGY | Age: 75
End: 2020-03-01
Attending: EMERGENCY MEDICINE
Payer: MEDICARE

## 2020-03-01 ENCOUNTER — APPOINTMENT (OUTPATIENT)
Dept: CT IMAGING | Age: 75
End: 2020-03-01
Attending: EMERGENCY MEDICINE
Payer: MEDICARE

## 2020-03-01 ENCOUNTER — HOSPITAL ENCOUNTER (EMERGENCY)
Age: 75
Discharge: HOME | End: 2020-03-01
Attending: EMERGENCY MEDICINE
Payer: MEDICARE

## 2020-03-01 VITALS
HEART RATE: 100 BPM | RESPIRATION RATE: 18 BRPM | DIASTOLIC BLOOD PRESSURE: 93 MMHG | HEIGHT: 62 IN | OXYGEN SATURATION: 100 % | WEIGHT: 273 LBS | BODY MASS INDEX: 50.24 KG/M2 | TEMPERATURE: 97.4 F | SYSTOLIC BLOOD PRESSURE: 166 MMHG

## 2020-03-01 DIAGNOSIS — F03.90 DEMENTIA WITHOUT BEHAVIORAL DISTURBANCE, UNSPECIFIED DEMENTIA TYPE: Primary | ICD-10-CM

## 2020-03-01 LAB
ALBUMIN SERPL-MCNC: 3.7 G/DL (ref 3.4–5)
ALBUMIN/GLOB SERPL: 1.1 {RATIO} (ref 0.8–1.7)
ALP SERPL-CCNC: 102 U/L (ref 45–117)
ALT SERPL-CCNC: 27 U/L (ref 13–56)
AMMONIA PLAS-SCNC: <10 UMOL/L (ref 11–32)
AMPHET UR QL SCN: NEGATIVE
ANION GAP SERPL CALC-SCNC: 7 MMOL/L (ref 3–18)
APPEARANCE UR: CLEAR
APTT PPP: 28.6 SEC (ref 23–36.4)
AST SERPL-CCNC: 19 U/L (ref 10–38)
ATRIAL RATE: 75 BPM
BARBITURATES UR QL SCN: NEGATIVE
BASOPHILS # BLD: 0 K/UL (ref 0–0.1)
BASOPHILS NFR BLD: 1 % (ref 0–2)
BENZODIAZ UR QL: NEGATIVE
BILIRUB SERPL-MCNC: 0.3 MG/DL (ref 0.2–1)
BILIRUB UR QL: NEGATIVE
BUN SERPL-MCNC: 13 MG/DL (ref 7–18)
BUN/CREAT SERPL: 14 (ref 12–20)
CALCIUM SERPL-MCNC: 9 MG/DL (ref 8.5–10.1)
CALCULATED P AXIS, ECG09: 45 DEGREES
CALCULATED R AXIS, ECG10: -5 DEGREES
CALCULATED T AXIS, ECG11: 22 DEGREES
CANNABINOIDS UR QL SCN: NEGATIVE
CHLORIDE SERPL-SCNC: 107 MMOL/L (ref 100–111)
CK MB CFR SERPL CALC: 4.6 % (ref 0–4)
CK MB SERPL-MCNC: 3.3 NG/ML (ref 5–25)
CK SERPL-CCNC: 71 U/L (ref 26–192)
CO2 SERPL-SCNC: 28 MMOL/L (ref 21–32)
COCAINE UR QL SCN: NEGATIVE
COLOR UR: YELLOW
CREAT SERPL-MCNC: 0.95 MG/DL (ref 0.6–1.3)
DIAGNOSIS, 93000: NORMAL
DIFFERENTIAL METHOD BLD: NORMAL
EOSINOPHIL # BLD: 0.1 K/UL (ref 0–0.4)
EOSINOPHIL NFR BLD: 1 % (ref 0–5)
ERYTHROCYTE [DISTWIDTH] IN BLOOD BY AUTOMATED COUNT: 13 % (ref 11.6–14.5)
GLOBULIN SER CALC-MCNC: 3.4 G/DL (ref 2–4)
GLUCOSE SERPL-MCNC: 99 MG/DL (ref 74–99)
GLUCOSE UR STRIP.AUTO-MCNC: NEGATIVE MG/DL
HCT VFR BLD AUTO: 43.6 % (ref 35–45)
HDSCOM,HDSCOM: NORMAL
HGB BLD-MCNC: 14.4 G/DL (ref 12–16)
HGB UR QL STRIP: NEGATIVE
INR PPP: 1 (ref 0.8–1.2)
KETONES UR QL STRIP.AUTO: ABNORMAL MG/DL
LEUKOCYTE ESTERASE UR QL STRIP.AUTO: NEGATIVE
LIPASE SERPL-CCNC: 58 U/L (ref 73–393)
LYMPHOCYTES # BLD: 1.3 K/UL (ref 0.9–3.6)
LYMPHOCYTES NFR BLD: 26 % (ref 21–52)
MAGNESIUM SERPL-MCNC: 2.3 MG/DL (ref 1.6–2.6)
MCH RBC QN AUTO: 30.1 PG (ref 24–34)
MCHC RBC AUTO-ENTMCNC: 33 G/DL (ref 31–37)
MCV RBC AUTO: 91.2 FL (ref 74–97)
METHADONE UR QL: NEGATIVE
MONOCYTES # BLD: 0.4 K/UL (ref 0.05–1.2)
MONOCYTES NFR BLD: 8 % (ref 3–10)
NEUTS SEG # BLD: 3.2 K/UL (ref 1.8–8)
NEUTS SEG NFR BLD: 64 % (ref 40–73)
NITRITE UR QL STRIP.AUTO: NEGATIVE
OPIATES UR QL: NEGATIVE
P-R INTERVAL, ECG05: 178 MS
PCP UR QL: NEGATIVE
PH UR STRIP: 8 [PH] (ref 5–8)
PLATELET # BLD AUTO: 194 K/UL (ref 135–420)
PMV BLD AUTO: 9.9 FL (ref 9.2–11.8)
POTASSIUM SERPL-SCNC: 3.9 MMOL/L (ref 3.5–5.5)
PROT SERPL-MCNC: 7.1 G/DL (ref 6.4–8.2)
PROT UR STRIP-MCNC: NEGATIVE MG/DL
PROTHROMBIN TIME: 13.3 SEC (ref 11.5–15.2)
Q-T INTERVAL, ECG07: 418 MS
QRS DURATION, ECG06: 86 MS
QTC CALCULATION (BEZET), ECG08: 466 MS
RBC # BLD AUTO: 4.78 M/UL (ref 4.2–5.3)
SODIUM SERPL-SCNC: 142 MMOL/L (ref 136–145)
SP GR UR REFRACTOMETRY: 1.01 (ref 1–1.03)
TROPONIN I SERPL-MCNC: <0.02 NG/ML (ref 0–0.04)
UROBILINOGEN UR QL STRIP.AUTO: 0.2 EU/DL (ref 0.2–1)
VENTRICULAR RATE, ECG03: 75 BPM
WBC # BLD AUTO: 4.9 K/UL (ref 4.6–13.2)

## 2020-03-01 PROCEDURE — 80053 COMPREHEN METABOLIC PANEL: CPT

## 2020-03-01 PROCEDURE — 70450 CT HEAD/BRAIN W/O DYE: CPT

## 2020-03-01 PROCEDURE — 83735 ASSAY OF MAGNESIUM: CPT

## 2020-03-01 PROCEDURE — 85610 PROTHROMBIN TIME: CPT

## 2020-03-01 PROCEDURE — 99285 EMERGENCY DEPT VISIT HI MDM: CPT

## 2020-03-01 PROCEDURE — 74011250636 HC RX REV CODE- 250/636: Performed by: EMERGENCY MEDICINE

## 2020-03-01 PROCEDURE — 83690 ASSAY OF LIPASE: CPT

## 2020-03-01 PROCEDURE — 71045 X-RAY EXAM CHEST 1 VIEW: CPT

## 2020-03-01 PROCEDURE — 93005 ELECTROCARDIOGRAM TRACING: CPT

## 2020-03-01 PROCEDURE — 80307 DRUG TEST PRSMV CHEM ANLYZR: CPT

## 2020-03-01 PROCEDURE — 85025 COMPLETE CBC W/AUTO DIFF WBC: CPT

## 2020-03-01 PROCEDURE — 82140 ASSAY OF AMMONIA: CPT

## 2020-03-01 PROCEDURE — 85730 THROMBOPLASTIN TIME PARTIAL: CPT

## 2020-03-01 PROCEDURE — 81003 URINALYSIS AUTO W/O SCOPE: CPT

## 2020-03-01 PROCEDURE — 82550 ASSAY OF CK (CPK): CPT

## 2020-03-01 RX ADMIN — SODIUM CHLORIDE 500 ML: 900 INJECTION, SOLUTION INTRAVENOUS at 14:26

## 2020-03-01 NOTE — ED PROVIDER NOTES
EMERGENCY DEPARTMENT HISTORY AND PHYSICAL EXAM    Date: 3/1/2020  Patient Name: Giselle Watts    History of Presenting Illness     Chief Complaint   Patient presents with    Anxiety         History Provided By: Patient     History Filippo Wise):   1:30 PM  Angelita Nunn is a 69 y.o. female with PMHX of Anxiety, arthritis, asthma, cancer (status post lumpectomy of the left breast), depression, stroke who presents to the emergency department C/O AMS onset 2-3 days ago. Pt states that over the last 2 to 3 days she is feeling more confused and stuck in the middle between her family members who argue a lot. During the interview, patient is using inappropriate words stating things such as, \" they have been asking me where I put my medications and I told them flower. \" Pt also appears to have difficulty with word finding and is tangential on conversation. Associated sxs include difficulty with word finding. Pt denies SI/HI or any other sxs or complaints. Chief Complaint: anxiety, difficulty concentrating  Duration: 2-3 days   Timing:  acute  Location: generalized  Quality: dificulty with focus  Severity: Mild  Modifying Factors: Nothing makes it better or worse. Associated Symptoms: anxiety    PCP: Leticia Gallegos MD     Current Outpatient Medications   Medication Sig Dispense Refill    escitalopram oxalate (LEXAPRO) 20 mg tablet Take 20 mg by mouth daily.  buPROPion XL (WELLBUTRIN XL) 150 mg tablet Take 150 mg by mouth every morning.  gabapentin (NEURONTIN) 300 mg capsule Take 400 mg by mouth three (3) times daily.  ALPRAZolam (XANAX) 1 mg tablet Take 1 mg by mouth three (3) times daily as needed for Anxiety.  amitriptyline (ELAVIL) 100 mg tablet Take 100 mg by mouth nightly.          Past History     Past Medical History:  Past Medical History:   Diagnosis Date    Anxiety     Arthritis     Asthma     allergy related    Cancer ([facility] [address]     lumpectomy lt breast    Depression     Stroke ([facility] [address] Past Surgical History:  Past Surgical History:   Procedure Laterality Date    BREAST SURGERY PROCEDURE UNLISTED      lumpectomy lt breast    HX APPENDECTOMY      HX CHOLECYSTECTOMY      HX DILATION AND CURETTAGE      HX GYN      HX HEENT      all of teeth removed     HX HYSTERECTOMY      HX ORTHOPAEDIC      total rt knee replacement    HX TUBAL LIGATION      HX WISDOM TEETH EXTRACTION         Family History:  History reviewed. No pertinent family history. Social History:  Social History     Tobacco Use    Smoking status: Former Smoker    Smokeless tobacco: Never Used   Substance Use Topics    Alcohol use: Never     Frequency: Never    Drug use: Never       Allergies: Allergies   Allergen Reactions    Iodinated Contrast Media Anaphylaxis    Dilantin [Phenytoin Sodium Extended] Hives         Review of Systems   Review of Systems   Constitutional: Negative for chills and fever. Respiratory: Negative for shortness of breath. Cardiovascular: Negative for chest pain. Gastrointestinal: Negative for abdominal pain, nausea and vomiting. Neurological: Negative for dizziness, seizures, speech difficulty, light-headedness and headaches. Psychiatric/Behavioral: Positive for confusion and decreased concentration. Negative for hallucinations and suicidal ideas. All other systems reviewed and are negative. Physical Exam     Vitals:    03/01/20 1400 03/01/20 1730 03/01/20 1830 03/01/20 1919   BP: 145/76 145/64 138/70 (!) 166/93   Pulse:    100   Resp:    18   Temp:       SpO2:    100%   Weight:       Height:         Physical Exam  Vitals signs and nursing note reviewed. Vital signs and nursing notes reviewed  CONSTITUTIONAL: Alert, in no apparent distress; well-developed; well-nourished. HEAD:  Normocephalic, atraumatic  EYES: PERRL; EOM's intact. ENTM: Nose: no rhinorrhea;  Throat: no erythema or exudate, mucous membranes moist  Neck:  No JVD, supple without lymphadenopathy  RESP: Chest clear, equal breath sounds. CV: S1 and S2 WNL; No murmurs, gallops or rubs. GI: Normal bowel sounds, abdomen soft and non-tender. No masses or organomegaly. : No costo-vertebral angle tenderness. BACK:  Non-tender  UPPER EXT:  Normal inspection  LOWER EXT: No edema, no calf tenderness. Distal pulses intact. NEURO: CN intact, reflexes 2/4 and sym, strength 5/5 and sym, sensation intact. Difficulty with word finding. SKIN: No rashes; Normal for age and stage. PSYCH:  Alert and oriented to self and situation, normal affect. Pt displays some confusion and is having difficulty with word finding and is tangiential.    Diagnostic Study Results     Labs -     Recent Results (from the past 12 hour(s))   CBC WITH AUTOMATED DIFF    Collection Time: 03/01/20  2:15 PM   Result Value Ref Range    WBC 4.9 4.6 - 13.2 K/uL    RBC 4.78 4.20 - 5.30 M/uL    HGB 14.4 12.0 - 16.0 g/dL    HCT 43.6 35.0 - 45.0 %    MCV 91.2 74.0 - 97.0 FL    MCH 30.1 24.0 - 34.0 PG    MCHC 33.0 31.0 - 37.0 g/dL    RDW 13.0 11.6 - 14.5 %    PLATELET 991 318 - 968 K/uL    MPV 9.9 9.2 - 11.8 FL    NEUTROPHILS 64 40 - 73 %    LYMPHOCYTES 26 21 - 52 %    MONOCYTES 8 3 - 10 %    EOSINOPHILS 1 0 - 5 %    BASOPHILS 1 0 - 2 %    ABS. NEUTROPHILS 3.2 1.8 - 8.0 K/UL    ABS. LYMPHOCYTES 1.3 0.9 - 3.6 K/UL    ABS. MONOCYTES 0.4 0.05 - 1.2 K/UL    ABS. EOSINOPHILS 0.1 0.0 - 0.4 K/UL    ABS.  BASOPHILS 0.0 0.0 - 0.1 K/UL    DF AUTOMATED     PROTHROMBIN TIME + INR    Collection Time: 03/01/20  2:15 PM   Result Value Ref Range    Prothrombin time 13.3 11.5 - 15.2 sec    INR 1.0 0.8 - 1.2     METABOLIC PANEL, COMPREHENSIVE    Collection Time: 03/01/20  2:15 PM   Result Value Ref Range    Sodium 142 136 - 145 mmol/L    Potassium 3.9 3.5 - 5.5 mmol/L    Chloride 107 100 - 111 mmol/L    CO2 28 21 - 32 mmol/L    Anion gap 7 3.0 - 18 mmol/L    Glucose 99 74 - 99 mg/dL    BUN 13 7.0 - 18 MG/DL    Creatinine 0.95 0.6 - 1.3 MG/DL    BUN/Creatinine ratio 14 12 - 20 GFR est AA >60 >60 ml/min/1.73m2    GFR est non-AA 58 (L) >60 ml/min/1.73m2    Calcium 9.0 8.5 - 10.1 MG/DL    Bilirubin, total 0.3 0.2 - 1.0 MG/DL    ALT (SGPT) 27 13 - 56 U/L    AST (SGOT) 19 10 - 38 U/L    Alk.  phosphatase 102 45 - 117 U/L    Protein, total 7.1 6.4 - 8.2 g/dL    Albumin 3.7 3.4 - 5.0 g/dL    Globulin 3.4 2.0 - 4.0 g/dL    A-G Ratio 1.1 0.8 - 1.7     MAGNESIUM    Collection Time: 03/01/20  2:15 PM   Result Value Ref Range    Magnesium 2.3 1.6 - 2.6 mg/dL   LIPASE    Collection Time: 03/01/20  2:15 PM   Result Value Ref Range    Lipase 58 (L) 73 - 393 U/L   CARDIAC PANEL,(CK, CKMB & TROPONIN)    Collection Time: 03/01/20  2:15 PM   Result Value Ref Range    CK 71 26 - 192 U/L    CK - MB 3.3 <3.6 ng/ml    CK-MB Index 4.6 (H) 0.0 - 4.0 %    Troponin-I, QT <0.02 0.0 - 0.045 NG/ML   AMMONIA    Collection Time: 03/01/20  2:15 PM   Result Value Ref Range    Ammonia <10 (L) 11 - 32 UMOL/L   PTT    Collection Time: 03/01/20  2:15 PM   Result Value Ref Range    aPTT 28.6 23.0 - 36.4 SEC   EKG, 12 LEAD, INITIAL    Collection Time: 03/01/20  2:46 PM   Result Value Ref Range    Ventricular Rate 75 BPM    Atrial Rate 75 BPM    P-R Interval 178 ms    QRS Duration 86 ms    Q-T Interval 418 ms    QTC Calculation (Bezet) 466 ms    Calculated P Axis 45 degrees    Calculated R Axis -5 degrees    Calculated T Axis 22 degrees    Diagnosis       Normal sinus rhythm  Moderate voltage criteria for LVH, may be normal variant  Borderline ECG  When compared with ECG of 06-APR-2019 13:43,  No significant change was found  Confirmed by Bin Vasquez MD, -- ([id number]) on 3/1/2020 6:54:33 PM     URINALYSIS W/ RFLX MICROSCOPIC    Collection Time: 03/01/20  4:30 PM   Result Value Ref Range    Color YELLOW      Appearance CLEAR      Specific gravity 1.009 1.005 - 1.030      pH (UA) 8.0 5.0 - 8.0      Protein NEGATIVE  NEG mg/dL    Glucose NEGATIVE  NEG mg/dL    Ketone TRACE (A) NEG mg/dL    Bilirubin NEGATIVE  NEG      Blood NEGATIVE NEG      Urobilinogen 0.2 0.2 - 1.0 EU/dL    Nitrites NEGATIVE  NEG      Leukocyte Esterase NEGATIVE  NEG     DRUG SCREEN, URINE    Collection Time: 03/01/20  4:30 PM   Result Value Ref Range    BENZODIAZEPINES NEGATIVE  NEG      BARBITURATES NEGATIVE  NEG      THC (TH-CANNABINOL) NEGATIVE  NEG      OPIATES NEGATIVE  NEG      PCP(PHENCYCLIDINE) NEGATIVE  NEG      COCAINE NEGATIVE  NEG      AMPHETAMINES NEGATIVE  NEG      METHADONE NEGATIVE  NEG      HDSCOM (NOTE)        Radiologic Studies -   CT HEAD WO CONT   Final Result   IMPRESSION:      1. Stable exam. No CT findings of an acute intracranial abnormality. Please note   that noncontrast head CT may be normal in early acute infarct. 2. Mild burden of chronic white matter changes. Stable volume loss. XR CHEST PORT   Final Result   IMPRESSION:      1. No acute cardiopulmonary process. CT Results  (Last 48 hours)               03/01/20 1543  CT HEAD WO CONT Final result    Impression:  IMPRESSION:       1. Stable exam. No CT findings of an acute intracranial abnormality. Please note   that noncontrast head CT may be normal in early acute infarct. 2. Mild burden of chronic white matter changes. Stable volume loss. Narrative:  EXAM: CT Head       INDICATION: Altered mental status, history of anxiety. COMPARISON: 5/31/2019. TECHNIQUE: Axial CT imaging of the head was performed without intravenous   contrast.   One or more dose reduction techniques were used on this CT: automated exposure   control, adjustment of the mAs and/or kVp according to patient size, and   iterative reconstruction techniques. The specific techniques used on this CT   exam have been documented in the patient's electronic medical record.  Digital   Imaging and Communications in Medicine (DICOM) format image data are available   to nonaffiliated external healthcare facilities or entities on a secure, media   free, reciprocally searchable basis with patient authorization for at least a   12-month period after this study. _______________       FINDINGS:       BRAIN:      > Brain volume: Accentuation of the cortical sulcal gyral pattern and sylvian   fissures, compatible with mild diffuse volume loss      > White matter: A mild amount of hypodense white matter lesions are seen   within the periventricular and subcortical white matter which are nonspecific,   but likely represent chronic small vessel changes. > Infarcts, encephalomalacia: None.      > Parenchymal mass: None.      > Parenchymal hemorrhage: None.      > Midline shift: None.      > Miscellaneous: None. EXTRA-AXIAL SPACES: Unremarkable. No fluid collections. CALVARIUM: Intact. SINUSES, MASTOIDS: Clear. OTHER EXTRACRANIAL: Unremarkable.       _______________               CXR Results  (Last 48 hours)               03/01/20 1453  XR CHEST PORT Final result    Impression:  IMPRESSION:       1. No acute cardiopulmonary process. Narrative:  EXAM: XR CHEST PORT       CLINICAL INDICATION/HISTORY: AMS   -Additional: None       COMPARISON: 5/31/2019, 4/6/2019       TECHNIQUE: Frontal view of the chest       _______________       FINDINGS:       HEART AND MEDIASTINUM: Midline cardiac silhouette, normal in size. Unremarkable   hilar vascular structures. LUNGS AND PLEURAL SPACES: No focal consolidation, parenchymal opacity. No   pneumothorax or pleural effusion. BONY THORAX AND SOFT TISSUES: No acute or destructive osseous abnormality. _______________                 Medications given in the ED-  Medications   sodium chloride 0.9 % bolus infusion 500 mL (0 mL IntraVENous IV Completed 3/1/20 1526)         Medical Decision Making   I am the first provider for this patient. I reviewed the vital signs, available nursing notes, past medical history, past surgical history, family history and social history.     Vital Signs-Reviewed the patient's vital signs. Pulse Oximetry Analysis - 100% on RA     Cardiac Monitor:  Rate: 92 bpm  Rhythm: NSR    EKG interpretation: (Preliminary)  Rhythm: NSR. Rate: 75 bpm; no STEMI  EKG read by Hortensia Oro MD at 2:46 PM     Records Reviewed: Nursing Notes    Provider Notes (Medical Decision Making):   DDX: Dementia, anxiety, hepatic encephalopathy, electrolyte disorder    Procedures:  Procedures    ED Course:   1:30 PM Initial assessment performed. The patients presenting problems have been discussed, and they are in agreement with the care plan formulated and outlined with them. I have encouraged them to ask questions as they arise throughout their visit. 4:30 PM Pt with confusion which is most likely due to dementia. No acute findings or worsening. Diagnosis and Disposition     Discussion:  69 y.o. female with acute episode of her dementia. Pt lives alone and does not have available family members to pick her up initially. Pt has no indication for admission today. After a prolonged period of waiting the nursing staff was finally able to convince her granddaughter to pick her up. DISCHARGE NOTE:  7:53 PM   Angelita GUERRERO Edouard's  results have been reviewed with her. She has been counseled regarding her diagnosis, treatment, and plan. She verbally conveys understanding and agreement of the signs, symptoms, diagnosis, treatment and prognosis and additionally agrees to follow up as discussed. She also agrees with the care-plan and conveys that all of her questions have been answered. I have also provided discharge instructions for her that include: educational information regarding their diagnosis and treatment, and list of reasons why they would want to return to the ED prior to their follow-up appointment, should her condition change. She has been provided with education for proper emergency department utilization. CLINICAL IMPRESSION:    1.  Dementia without behavioral disturbance, unspecified dementia type ([facility] [address]        PLAN:  1. D/C Home  2. Current Discharge Medication List        3. Follow-up Information     Follow up With Specialties Details Why Contact Info    Jessica Hayden MD Family Practice, Palliative Medicine Schedule an appointment as soon as possible for a visit in 1 day For follow up from Emergency Department visit. [address]      [facility] EMERGENCY DEPT Emergency Medicine  As needed; If symptoms worsen [address] [place] [id number]  [address]     Please note that this dictation was completed with [employer], the computer voice recognition software. Quite often unanticipated grammatical, syntax, homophones, and other interpretive errors are inadvertently transcribed by the computer software. Please disregard these errors. Please excuse any errors that have escaped final proofreading.     Elias Travis MD

## 2020-03-01 NOTE — ED NOTES
Phone call made to Lanny noonan to advise her patient ready to be picked up. Mrs Jyotsna Archuleta stated she is unable to  patient and does not know anyone who could assist the patient home

## 2020-03-02 NOTE — ED NOTES
Patient discharged at this time. Daughter Sarah Skaggs Verbalized understanding of plan of care and discharge instructions. . Arm band placed in shred it. Manisha noonan to provide ride home.

## 2020-03-02 NOTE — DISCHARGE INSTRUCTIONS
Patient Education        Dementia: Care Instructions  Your Care Instructions    Dementia is a loss of mental skills that affects your daily life. It is different than the occasional trouble with memory that is part of aging. You may find it hard to remember things that you feel you should be able to remember. Or you may feel that your mind is just not working as well as usual.  Finding out that you have dementia is a shock. You may be afraid and worried about how the condition will change your life. Although there is no cure at this time, medicine may slow memory loss and improve thinking for a while. Other medicines may be able to help you sleep or cope with depression and behavior changes. Dementia often gets worse slowly. But it can get worse quickly. As dementia gets worse, it may become harder to do common things that take planning, like making a list and going shopping. Over time, the disease may make it hard for you to take care of yourself. Some people with dementia need others to help care for them. Dementia is different for everyone. You may be able to function well for a long time. In the early stage of the condition, you can do things at home to make life easier and safer. You also can keep doing your hobbies and other activities. Many people find comfort in planning now for their future needs. Follow-up care is a key part of your treatment and safety. Be sure to make and go to all appointments, and call your doctor if you are having problems. It's also a good idea to know your test results and keep a list of the medicines you take. How can you care for yourself at home? · Take your medicines exactly as prescribed. Call your doctor if you think you are having a problem with your medicine. · Eat healthy foods. Eat lots of whole grains, fruits, and vegetables every day.  If you are not hungry, try snacks or nutritional drinks such as Boost, Ensure, or Sustacal.  · If you have problems sleeping:  ? Try not to nap too close to your bedtime. ? Exercise regularly. Walking is a good choice. ? Try a glass of warm milk or caffeine-free herbal tea before bed. · Do tasks and activities during the time of day when you feel your best. It may help to develop a daily routine. · Post labels, lists, and sticky notes to help you remember things. Write your activities on a calendar you can easily find. Put your clock where you can easily see it. · Stay active. Take walks in familiar places, or with friends or loved ones. Try to stay active mentally too. Read and work crossword puzzles if you enjoy these activities. · Do not drive unless you can pass an on-road driving test. If you are not sure if you are safe to drive, your state 's license bureau can test you. · Keep a cordless phone and a flashlight with new batteries by your bed. If possible, put a phone in each of the main rooms of your house, or carry a cell phone in case you fall and cannot reach a phone. Or, you can wear a device around your neck or wrist. You push a button that sends a signal for help. Acknowledge your emotions and plan for the future  · Talk openly and honestly with your doctor. · Let yourself grieve. It is common to feel angry, scared, frustrated, anxious, or depressed. · Get emotional support from family, friends, a support group, or a counselor experienced in working with people who have dementia. · Ask for help if you need it. · Tell your doctor how you feel. You may feel upset, angry, or worried at times. Many things can cause this, including poor sleep, medicine side effects, confusion, and pain. Your doctor may be able to help you. · Plan for the future. ? Talk to your family and doctor about preparing a living will and other important papers while you can make decisions. These papers tell your doctors how to care for you at the end of your life.   ? Consider naming a person to make decisions about your care if you are not able to.  When should you call for help? Call 911 anytime you think you may need emergency care. For example, call if:    · You are lost and do not know whom to call.     · You are injured and do not know whom to call.    Call your doctor now or seek immediate medical care if:    · You are more confused or upset than usual.     · You feel like you could hurt yourself because your mind is not working well.   Alejandro Gutiérrez closely for changes in your health, and be sure to contact your doctor if you have any problems. Where can you learn more? Go to http://carina-stacia.info/. Enter X680 in the search box to learn more about \"Dementia: Care Instructions. \"  Current as of: May 28, 2019  Content Version: 12.2  © 8082-7626 "[employer]. Care instructions adapted under license by [employer] (which disclaims liability or warranty for this information). If you have questions about a medical condition or this instruction, always ask your healthcare professional. [address] any warranty or liability for your use of this information.

## 2020-03-02 NOTE — ED NOTES
This RN spoke to Lanny who is the patients daughter and will be coming to get her. Lanny states she will be here in 20 minutes.

## 2020-03-02 NOTE — ED NOTES
Report received from CONCETTA Simmons. Pt attempting to run out of ambulance bay door and states \"I am going home\" redirected patient to her room, pt alert to self only. Pt currently in bed, bed alarm on.

## 2020-03-02 NOTE — ED NOTES
This RN spoke to granddaughter Holland Onel states that she can not come get patient at this time due to being at work but will attempt to call her boyfriend to come get patient. Nursing supervisor aware of situation.

## 2021-06-28 ENCOUNTER — HOSPITAL ENCOUNTER (OUTPATIENT)
Age: 76
Setting detail: OUTPATIENT SURGERY
Discharge: HOME | End: 2021-06-28
Attending: INTERNAL MEDICINE | Admitting: INTERNAL MEDICINE
Payer: MEDICARE

## 2021-06-28 VITALS
BODY MASS INDEX: 27.86 KG/M2 | OXYGEN SATURATION: 98 % | SYSTOLIC BLOOD PRESSURE: 124 MMHG | TEMPERATURE: 97.8 F | WEIGHT: 151.4 LBS | HEART RATE: 66 BPM | RESPIRATION RATE: 18 BRPM | DIASTOLIC BLOOD PRESSURE: 68 MMHG | HEIGHT: 62 IN

## 2021-06-28 PROCEDURE — 74011250636 HC RX REV CODE- 250/636: Performed by: INTERNAL MEDICINE

## 2021-06-28 PROCEDURE — 88305 TISSUE EXAM BY PATHOLOGIST: CPT

## 2021-06-28 PROCEDURE — 76040000007: Performed by: INTERNAL MEDICINE

## 2021-06-28 PROCEDURE — 2709999900 HC NON-CHARGEABLE SUPPLY: Performed by: INTERNAL MEDICINE

## 2021-06-28 PROCEDURE — 77030039961 HC KT CUST COLON BSC -D: Performed by: INTERNAL MEDICINE

## 2021-06-28 PROCEDURE — 99153 MOD SED SAME PHYS/QHP EA: CPT | Performed by: INTERNAL MEDICINE

## 2021-06-28 PROCEDURE — G0500 MOD SEDAT ENDO SERVICE >5YRS: HCPCS | Performed by: INTERNAL MEDICINE

## 2021-06-28 PROCEDURE — 77030040361 HC SLV COMPR DVT MDII -B: Performed by: INTERNAL MEDICINE

## 2021-06-28 RX ORDER — SODIUM CHLORIDE 9 MG/ML
150 INJECTION, SOLUTION INTRAVENOUS CONTINUOUS
Status: DISCONTINUED | OUTPATIENT
Start: 2021-06-28 | End: 2021-06-28 | Stop reason: HOSPADM

## 2021-06-28 RX ORDER — MIDAZOLAM HYDROCHLORIDE 1 MG/ML
INJECTION, SOLUTION INTRAMUSCULAR; INTRAVENOUS AS NEEDED
Status: DISCONTINUED | OUTPATIENT
Start: 2021-06-28 | End: 2021-06-28 | Stop reason: HOSPADM

## 2021-06-28 RX ORDER — FENTANYL CITRATE 50 UG/ML
INJECTION, SOLUTION INTRAMUSCULAR; INTRAVENOUS AS NEEDED
Status: DISCONTINUED | OUTPATIENT
Start: 2021-06-28 | End: 2021-06-28 | Stop reason: HOSPADM

## 2021-06-28 RX ORDER — DIPHENHYDRAMINE HYDROCHLORIDE 50 MG/ML
INJECTION, SOLUTION INTRAMUSCULAR; INTRAVENOUS AS NEEDED
Status: DISCONTINUED | OUTPATIENT
Start: 2021-06-28 | End: 2021-06-28 | Stop reason: HOSPADM

## 2021-06-28 RX ORDER — CLONAZEPAM 0.5 MG/1
0.5 TABLET ORAL
COMMUNITY

## 2021-06-28 RX ADMIN — SODIUM CHLORIDE 150 ML/HR: 900 INJECTION, SOLUTION INTRAVENOUS at 08:44

## 2021-06-28 NOTE — PROCEDURES
(EGD) Esophagogastroduodenoscopy (UPPER ENDOSCOPY) Procedure Note  [facility] [place]  __________________________________________________________________________________________________________________________      6/28/2021     Patient: Nadia Nunn YOB: 1945 Gender: female Age: 70 y.o. INDICATION:  Pt of Dr. Benedict Morley [facility] ENT) referred to GI after being evaluated for dysphagia x 4 months to pills and solids getting stuck, with throat clearing, globus sensation, and descriptions of a  vibration in chest region that is constantly being felt but worse after eating. These symptoms began in September of 2020. Tried taking PPI (Prevacid) for this. She has early satiety, lost 45lbs since March, decreased appetite, fullness, fatigue, reflux, occ. nausea 1-2/week, no vomiting, BM: Irregular. No prior EGD's reported. Last colonoscopy was in 2007, negative exam, per pt. We will re-visit need for repeat colonoscopy upon follow-up visit. Speech Language Pathology Report done on 2/8/2021 @[facility]-  Modified Barium Swallow Study:   Pt seen for MBS due to pills feeling stuck in throat, globus sensation, and weight loss. Pt notes she has dentures but they hurt to wear them so she can't chew hard food. During oral phase, pt demo'd decreased bolus formation and oral transit >15 secs for trials of chopped solids. Pt could not swallow bolus without liquid wash. She has been having rare heartbruns about once every week x 4 months. She tried 2 months ago a PPI for 1 week but stopped it because it didn't help. : Neha Rangel MD    Referring Provider:  Other, MD Leticia    Sedation:  Versed 9 mg IV, Fentanyl 100 mcg IV, Benadryl 50 mg IV    Procedure Details:  After infomed consent was obtained for the procedure, with all risks and benefits of procedure explained to the patient. She was taken to the endoscopy suite and placed in the left lateral decubitus position.   Following sequential administration of sedation as per above, the endoscope was inserted into the mouth and advanced under direct vision to the proximal jejunum. A careful inspection was made as the gastroscope was withdrawn, including a retroflexed view of the proximal stomach; findings and interventions are described below. OROPHARYNX: The vocal Cords and the larynx are normal.   ESOPHAGUS: The proximal, mid, and distal oesophagus are normal. The Z-Line is hard to see and irregular with at least two tongue like protrusions going over > 1 cm up to 35 cm 4 biopsies taken from that area. 1 cm Hiatal hernia. Diaphragmatic opening or notch is located at 37 cm. No visible esophagitis or esophageal stenosis. Empiric esophageal dilatation is made with Espinal bougie # 50 Fr which passed without difficulty or trauma on repeat endoscopy. STOMACH: No evidence of blood, fluid or solid food retention. The fundus on antegrade and retroflex views is normal. The cardia, body, lesser curvature, greater curvature, the antrum, and pylorus are normal. The gastric mucosa is normal.  DUODENUM: The bulb, second, third, fourth portions and major papilla are normal.  PROXIMAL JEJUNUM: Unremarkable. Therapies:  Esophageal dilatation with Espinal bougie # 50 Fr and distal esophageal biopsies    Specimen: one           Complications:   None    EBL:  Negligible. IMPLANTS: * No implants in log *  IMPRESSION: The Z-Line is hard to see and irregular with at least two tongue like protrusions going over > 1 cm up to 35 cm 4 biopsies taken from that area. 1 cm Hiatal hernia. No visible esophagitis or esophageal stenosis. Empiric esophageal dilatation is made with Espinal bougie # 50 Fr which passed without difficulty or trauma on repeat endoscopy. RECOMMENDATION:  May resume antireflux diet. Avoid NSAID's for 2 weeks. Make a FU appointment at the office. Start taking Omeprazole 20 mg daily x 2 weeks and then as needed.  Avoid eating for > 3 hours before reclining and lift the head of the bed with a wedge. Take OTC antacids as needed to control sporadic heartburns.     Assistant: None    --David Steen MD on 6/28/2021 at 10:31 AM

## 2021-06-28 NOTE — PERIOP NOTES
Reviewed [facility] plan of care with patient and her daughter in person. Written instructions provided as we. . Patient did speak with Dr Hemal Rogers

## 2021-06-28 NOTE — H&P
Assessment/Plan  # Detail Type Description    1. Assessment Abnormal findings on dx imaging of prt digestive tract (R93.3). Impression Pt of Dr. Garfield Alberto [facility]) referred to GI after being evaluated for dysphagia to pills and solids getting stuck, with throat clearing, globus sensation, and descriptions of a  vibration in epigastric region that is constantly being felt. These symptoms began in September of 2020. Tried taking PPI (Prevacid) for this.  early satiety, lost 45lbs since March, decreased appetite, fullness, fatigue, reflux, occ. nausea 1-2/week, no vomiting, BM: Irregular. No prior EGD's reported. Last colonoscopy was in 2007, negative exam, per pt. We will re-visit need for repeat colonoscopy upon follow-up visit. Speech Language Pathology Report done on 2/8/2021 @[facility]-  Modified Barium Swallow Study  Assessment:     Pt seen for MBS due to pills feeling stuck in throat, globus sensation, and weight loss. Pt notes she has dentures but they hurt to wear them so she can't chew hard food. During oral phase, pt demo'd decreased bolus formation and oral transit >15 secs for trials of chopped solids. Pt could not swallow bolus without liquid wash. In addition, there was premature spillage for all PO. During pharyngeal phase, there was no evidence of penetration or aspiration. There was no pharyngeal residue post swallows. 12.7mm barium tablet passed through esophagus without difficulty. [facility] with thin liquids. Rec outpatient speech therapy for dysphagia treatment with focus on oral phase (**MD, please order). Also, rec GI consult if globus sensation persist. POC d/w pt. Patient Plan *Overall Plan:   1) Labs ordered to evaluate causes of weight loss and fatigue. 2) EGD now  3) Will discuss need for C-scope at next follow-up appointment, as current work-up is more pressing at this time         2. Assessment Dysphagia (R13.10).     Impression Pharyngoesophageal phase, Dysphagia. PM/SH: TIA (2016), Right renal mass, Former smoker, Breast cancer s/p left mastectomy, Reactive airway dz, depression, anxiety, panic disorder, menopause, DDD and OA, LBP, Abdominal Hysterectomy (1982), BTL (1970's), Cholecystectomy, Tonsillectomy, Left TKR, Left Knee arthroscopy w/meniscus repair, and right knee arthroscopy, Former smoker. Patient Plan *EGD Plan: Will proceed with EGD with Dr. Francesca Keith @[facility], to evaluate upper GI tract for abnormalities, evidence to explain symptoms, and Aldrich's epithelium with biopsies, polypectomies, and dilation of strictures as indicated. -Bring inhalers to procedure. *EGD Risks:  Explained risks of procedure to include bleeding, infection, reaction to sedation, and perforation with possible need for admission to the hospital, and in the most extensive of  circumstances, the patient may require surgery. Pt verbalized understanding of these risks and is agreeable with this procedure    *Dysphagia - \"Safe Swallowing\" Education - Patient advised to:  1) Take your time when eating. 2) Sit up straight. 3) Take small bites and sips. 4) Chew food thoroughly before swallowing. 5) Reduce talking and distractions during meals. 6) Make sure your mouth is empty before taking the next bite    Plan Orders Further diagnostic evaluations ordered today include(s) UPPER GI ENDOSCOPY, BIOPSY to be performed. 3. Assessment GERD without esophagitis (K21.9). Patient Plan *GERD - Patient is advised to:  1. Eat smaller meals and maintain and lowfat, low carb diet  2. Change to decaffeinated beverages only  3. Not eat within 3-4 hours of bedtime  4. To elevate the torso while sleeping at night either by sleeping on a foam incline wedge or by elevating the head of the bed. 5. Avoid alcohol, NSAIDs, citrus and acidic foods as these things can contribute to acid reflux.     Plan Orders She will be scheduled for GASTROENTEROLOGY PROCEDURE, Next Lab Date is within 5 Weeks on 04/20/2021. Clinical information/comments: at location [facility]. The surgeon scheduled is Stephany Liu MD. An assistant has not been requested. 4. Assessment Fatigue (R53.83). Patient Plan -Labs ordered today to evaluate cause of fatigue and weight loss. Plan Orders CBC With Differential/Platelet to be performed. , Comp. Metabolic Panel (14) to be performed. , Ferritin, Serum to be performed., Hepatic Function Panel (7) to be performed., Hepatitis Panel (4) to be performed., Iron and TIBC to be performed., Lipase, Serum to be performed. , Sedimentation Rate-Westergren to be performed. , Thyroid Panel to be performed. and Vitamin B12 and Folate to be performed. 5. Assessment Abnormal weight loss (R63.4). Patient Plan Pt is underweight. 6. Assessment Loss of appetite (R63.0). Patient Plan See above. 7. Assessment Nausea (R11.0). Patient Plan See above. 8. Assessment Early satiety (R68.81). Patient Plan See above              This 70year old  patient was referred by Torey Palmer. This 70year old female presents for Dysphagia and Abnormal GI Study. History of Present Illness  1. Dysphagia   The difficulty in swallowing began 7 months ago. The symptoms are moderate, unchanged and occur episodically. The symptoms occur with solids only which cause pills and solids get trapped with food sticking in the throat and upper chest. The symptoms are felt to be related to history of stroke. The patient has a history of reflux and stroke. The patient is also experiencing cough, decreased appetite, early satiety, food sticking, foreign body sensation in throat, heartburn, nausea, weight loss and throat clearing.  The patient denies back pain, bloating, chest pain, chest pressure or discomfort, choking, cough following swallowing, epigastric pain, food on pillow in AM, forced regurgitation, halitosis, hoarseness, nasal regurgitation, oral regurgitation, sore throat, vomiting or weight gain. Additional information: descriptions of a  vibration in epigastric region that is constantly being felt. Hx of Breast cancer and TIA in 2016.        2.  Abnormal GI Study   Onset: on 02/08/2021. Severity level is mild-moderate. Type of study:  Modified Barium Swallow. The patient is also experiencing dysphagia and nausea. Pertinent negatives include abdominal pain, bloating, constipation, diarrhea and vomiting. BM: Irregular. Last colonoscopy was in 2007, negative exam, per pt. We will re-visit need for repeat colonoscopy upon follow-up visit.       Problem List  Problem Description Onset Date Chronic Clinical Status Notes   Migraine 11/08/2002 N     Menopause present 11/08/2002 N     Anxiety 05/03/2000 N     Adiposity 03/06/2003 N     Altered mental status 03/02/2020 N     Fibromyalgia 10/03/2007 N     Low back pain 04/21/2008 N     History of depression 09/12/2007 N     Iritis 04/21/2008 N     Multiple-type hyperlipidemia 11/08/2002 N     Degeneration of intervertebral disc of lumbar region 11/08/2002 N     Colloid carcinoma 06/11/2009 N     Panic disorder 11/08/2002 N     Depression 02/18/2004 N     Urinary urgency 10/30/2008 N     Benzodiazepine withdrawal with delirium 03/02/2020 N     Gastroesophageal reflux disease 02/18/2004 N     Malignant neoplasm of breast (female), unspecified site 04/21/2008 N     Substance abuse 03/02/2020 N     Osteoarthritis of knee 06/21/2004 N     Mononeuropathy of lower extremity 10/03/2007 N     Knee pain 11/08/2002 N     Incontinence 01/23/2008 N     Carcinoma of breast 09/12/2006 N     Atopic rhinitis 11/08/2002 N     Acquired absence of both cervix and uterus 09/12/2007 N     Presence of right artificial knee joint 09/12/2007 N     Fibrocystic breast changes 11/08/2002 N     Renal mass 04/21/2008 N       Past Medical/Surgical History   (Detailed)  Disease/disorder Onset Date Management Date Comments   TIA - Transient ischaemic attack 2016        Abdominal Hysterectomy [facility]    Depression  Psychiatric admission [facility] 2008    Peripheral Neuropathy       Anxiety       Right Renal mass       DDD       OA - Osteoarthritis       Allergic rhinitis       Former Smoker       Breast cancer (Colloid carcinoma)  Left Mastectomy     LBP       Fibrocystic dz of breasts       Iritis       Reactive Airway dz       Menopause         Knee replacement, total       Cholecystectomy       Right knee arthroscopy       Left knee arthroscopy w/meniscus repair       Tonsillectomy     Colonic Adenomas:  Negative Exams:  (per pt)     Panic Disorder             Family History   (Detailed)    Relationship Family Member Name  Age at Death Condition Onset Age Cause of Death       No family history of colorectal cancer. N   Natural Father    Heart attack  N   Natural Mother    Emphysema  N     Social History  (Detailed)  Tobacco use reviewed. Preferred language is [place]. Marital Status/Family/Social Support  Marital status:      Tobacco use status: Current non-smoker. Smoking status: Never smoker. Tobacco Screening  Patient has never used tobacco. Patient has not used tobacco in the last 30 days. Patient has not used smokeless tobacco in the last 30 days. Smoking Status  Type Smoking Status Usage Per Day Years Used Pack Years Total Pack Years    Never smoker         Alcohol  There is no history of alcohol use. Caffeine  The patient uses caffeine: coffee - 2 cups a day.       Medications (active prior to today)  Medication Instructions Start Date Stop Date Refilled Elsewhere   albuterol sulfate 2.5 mg/3 mL (0.083 %) solution for nebulization Take 3 mL (2.5 mg total) by nebulization every 4 (four) to 6 (six) hours as needed for wheezing or shortness of breath 2020 04/10/2021  Y   Ventolin HFA 90 mcg/actuation aerosol inhaler Inhale 2 puffs every 4 (four) hours as needed (shortness of breath/wheezing) 04/01/2020 04/02/2021  Y   Skin Treatment 12 % lotion Apply topically daily as needed for dry skin 01/19/2021 01/20/2022  Y   clonazepam 0.5 mg tablet Take 0.5 tablets (0.25 mg total) by mouth 2 (two) times a day as needed (anxiety or insomnia) 04/09/2020   Y   escitalopram 20 mg tablet Take 0.5 tablets (10 mg total) by mouth daily with dinner 03/09/2020   Y   amitriptyline 100 mg tablet Take 1 Tab by Mouth Every Night at Bedtime. // 03/18/2021  Y   Aromasin 25 mg tablet Take 1 Tab by Mouth Once a Day. // 03/18/2021  Y   Celebrex 200 mg capsule Take 1 Cap by Mouth Daily as needed ref # [id number] 12/17/2008 03/18/2021  Y   clotrimazole-betamethasone 1 %-0.05 % topical cream Use 1 Application to affected area Twice Daily. Apply to rash. 04/21/2008   Y   Duragesic 75 mcg/hr transdermal patch Apply 1 Patch as directed Every 72 Hours. // 03/18/2021  Y   Lasix 40 mg tablet Take 1 Tab by Mouth Once a Day. 07/15/2008 03/18/2021  Y   Singulair 10 mg tablet Take 1 Tab by Mouth Once a Day. 05/19/2008 03/18/2021  Y   Neurontin 600 mg tablet  // 03/18/2021  Y   VICODIN 5-500 mg PO TABS Take 1 Tab by Mouth Every 4 Hours As Needed // 03/18/2021  Y   Wellbutrin  mg 24 hr tablet, extended release Take 300 mg by Mouth Once a Day. // 03/18/2021  Y   Xanax 1 mg tablet Take 1 Tab by Mouth 4 Times Daily As Needed. // 03/18/2021  Y   Neurontin 300 mg capsule take 1 capsule by oral route 3 times every day //   Y     Patient Status   Completed with information received for patient in a summary of care record. Medication Reconciliation  Medications reconciled today.     Medication Reviewed  Adherence Medication Name Sig Desc Elsewhere Status   taking as directed Neurontin 300 mg capsule take 1 capsule by oral route 3 times every day Y Verified   taking as directed escitalopram 20 mg tablet Take 0.5 tablets (10 mg total) by mouth daily with dinner Y Verified   taking as directed clotrimazole-betamethasone 1 %-0.05 % topical cream Use 1 Application to affected area Twice Daily. Apply to rash. Y Verified   taking as directed clonazepam 0.5 mg tablet Take 0.5 tablets (0.25 mg total) by mouth 2 (two) times a day as needed (anxiety or insomnia) Y Verified   taking as directed Ventolin HFA 90 mcg/actuation aerosol inhaler Inhale 2 puffs every 4 (four) hours as needed (shortness of breath/wheezing) Y Verified   taking as directed albuterol sulfate 2.5 mg/3 mL (0.083 %) solution for nebulization Take 3 mL (2.5 mg total) by nebulization every 4 (four) to 6 (six) hours as needed for wheezing or shortness of breath Y Verified   taking as directed Skin Treatment 12 % lotion Apply topically daily as needed for dry skin Y Verified     Medications (Added, Continued or Stopped today)  Start Date Medication Directions PRN Status PRN Reason Instruction Stop Date   04/09/2020 albuterol sulfate 2.5 mg/3 mL (0.083 %) solution for nebulization Take 3 mL (2.5 mg total) by nebulization every 4 (four) to 6 (six) hours as needed for wheezing or shortness of breath N   04/10/2021    amitriptyline 100 mg tablet Take 1 Tab by Mouth Every Night at Bedtime. N   03/18/2021    Aromasin 25 mg tablet Take 1 Tab by Mouth Once a Day. N   03/18/2021 12/17/2008 Celebrex 200 mg capsule Take 1 Cap by Mouth Daily as needed ref # [id number] N   03/18/2021 04/09/2020 clonazepam 0.5 mg tablet Take 0.5 tablets (0.25 mg total) by mouth 2 (two) times a day as needed (anxiety or insomnia) N      04/21/2008 clotrimazole-betamethasone 1 %-0.05 % topical cream Use 1 Application to affected area Twice Daily. Apply to rash. N       Duragesic 75 mcg/hr transdermal patch Apply 1 Patch as directed Every 72 Hours. N   03/18/2021 03/09/2020 escitalopram 20 mg tablet Take 0.5 tablets (10 mg total) by mouth daily with dinner N      07/15/2008 Lasix 40 mg tablet Take 1 Tab by Mouth Once a Day.  N   03/18/2021    Neurontin 300 mg capsule take 1 capsule by oral route 3 times every day N       Neurontin 600 mg tablet  N   03/18/2021 05/19/2008 Singulair 10 mg tablet Take 1 Tab by Mouth Once a Day. N   03/18/2021 01/19/2021 Skin Treatment 12 % lotion Apply topically daily as needed for dry skin N   01/20/2022 04/01/2020 Ventolin HFA 90 mcg/actuation aerosol inhaler Inhale 2 puffs every 4 (four) hours as needed (shortness of breath/wheezing) N   04/02/2021    VICODIN 5-500 mg PO TABS Take 1 Tab by Mouth Every 4 Hours As Needed N   03/18/2021    Wellbutrin  mg 24 hr tablet, extended release Take 300 mg by Mouth Once a Day. N   03/18/2021    Xanax 1 mg tablet Take 1 Tab by Mouth 4 Times Daily As Needed. N   03/18/2021     Allergies  Ingredient Reaction (Severity) Medication Name Comment   CIPROFLOXACIN Other (see comments)  GI distress   IODINATED CONTRAST MEDIA Anaphylaxis; Angioedema     IODINE unknown (Unknown)     PHENYTOIN SODIUM Hives (Unknown)     PHENYTOIN SODIUM EXTENDED Hives (Unknown)     WATERMELON            Orders  Status Lab Order Time Frame Comments   ordered UPPER GI ENDOSCOPY, BIOPSY     ordered CBC With Differential/Platelet     ordered Comp. Metabolic Panel (14)     ordered Ferritin, Serum     ordered Iron and TIBC     ordered Vitamin B12 and Folate     ordered Hepatic Function Panel (7)     ordered Hepatitis Panel (4)     ordered Lipase, Serum     ordered Sedimentation Rate-Westergren     ordered Thyroid Panel     ordered GASTROENTEROLOGY PROCEDURE within 5 Weeks at location [address] [facility] surgeon scheduled is Devin Leija MD. An assistant has not been requested. Review of Systems  System Neg/Pos Details   Constitutional Positive Weight loss. Constitutional Negative Fever and Weight gain. ENMT Positive Foreign body sensation in throat. ENMT Negative Dental erosions, Globus sensation, Halitosis, Hoarseness, Post-nasal drainage, Sinus Infection and Sore throat. Eyes Negative Double vision. Respiratory Positive Cough. Respiratory Negative Aspiration, Asthma, Chronic cough, Dyspnea, Pneumonitis and Stridor. Cardio Negative Chest pain, Chest pressure or discomfort, Edema and Irregular heartbeat/palpitations. GI Positive Decreased appetite, Dysphagia, Early satiety, Food sticking, Heartburn, Nausea, Reflux. GI Negative Abdominal pain, Awakenings with choking or heartburn, Bloating, Change in bowel habits, Choking, Constipation, Cough following swallowing, Diarrhea, Epigastric pain, Food on pillow in AM, Forceful vomiting, Hematemesis, Hematochezia, Melena, Nasal regurgitation, Oral regurgitation and Vomiting.  Negative Dysuria and Hematuria. Endocrine Negative Cold intolerance and Heat intolerance. Neuro Negative Dizziness, Headache, Numbness and Tremors. Psych Negative Anxiety, Depression and Increased stress. Integumentary Negative Hives, Pruritus and Rash. MS Negative Back pain, Joint pain and Myalgia. Hema/Lymph Negative Easy bleeding, Easy bruising and Lymphadenopathy. Allergic/Immuno Negative Food allergies and Immunosuppression. Vital Signs   Height  Time ft in cm Last Measured Height Position   1:18 PM 5.0 2.00 157.48 03/18/2021 Standing     Weight/BSA/BMI  Time lb oz kg Context BMI kg/m2 BSA m2   1:18 .00  66.224 dressed with shoes 26.70 1.70   Vitals (last day)    Date/Time Temp Pulse BP Cardiac Rhythm [facility]   06/28/21 0836 97.2 °F (36.2 °C) 74 108/64 -- HE      Respiratory Therapy (last day)    Date/Time Resp SpO2 O2 Device O2 Flow Rate (L/min) Who   06/28/21 0836 16 99 % None (Room air) -- HE       Physical  Exam  Exam Findings Details   Constitutional Normal Well developed. Eyes Normal Conjunctiva - Right: Normal, Left: Normal. Sclera - Right: Normal, Left: Normal.   Nasopharynx Normal Lips/teeth/gums - Normal.   Neck Exam Normal Inspection - Normal.   Respiratory Normal Inspection - Normal.   Cardiovascular Normal Rhythm - Regular. Extra sounds - None. Murmurs - None. Vascular Normal Pulses - Brachial: Normal.   Skin Normal Inspection - Normal.   Musculoskeletal Normal Hands/Wrist - Right: Normal, Left: Normal.   Extremity Normal No edema. Neurological Normal Fine motor skills - Normal.   Psychiatric Normal Orientation - Oriented to time, place, person & situation. Appropriate mood and affect.      Active Patient Care Team Members  Name Contact Agency Type Support Role Relationship Active Date Inactive Date Specialty   Lanny King   Emergency Contact Child, Mother is the Patient      Ottoniel Morrow   Patient provider PCP      Fahad Mcdaniel   encounter provider    Gastroenterology       No change in H&P

## 2021-06-28 NOTE — DISCHARGE INSTRUCTIONS
Soledad Johns  [id number]  1945    EGD DISCHARGE INSTRUCTIONS  Discomfort:  Sore throat- throat lozenges or warm salt water gargle  redness at IV site- apply warm compress to area; if redness or soreness persist- contact your physician  Gaseous discomfort- walking, belching will help relieve any discomfort  You may not operate a vehicle until the next day  You may not engage in an occupation involving machinery or appliances until the next day  You may not drink alcoholic beverages until the next day  Avoid making any critical decisions for at least 24 hour    DIET   You may not resume your regular diet. Antireflux diet. ACTIVITY  You may not resume your normal daily activities   Spend the remainder of the day resting -  avoid any strenuous activity. CALL M.D. ANY SIGN OF   Increasing pain, nausea, vomiting  Abdominal distension (swelling)  New increased bleeding (oral or rectal)  Fever (chills)  Pain in chest area  Bloody discharge from nose or mouth  Shortness of breath     You may not take any Advil, Aspirin, Ibuprofen, Motrin, Aleve, or Goodys for 7 days, preferably ONLY  Tylenol as needed for pain. Follow-up Instructions: Follow-up in the office as scheduled or make a follow-up appointment in 2 weeks.      Kristi Tse MD  June 28, 2021

## 2022-05-17 NOTE — ED TRIAGE NOTES
Pt arrives per EMS w/ c/o anxiety, EMS reports pt ran out of her xanax prescription d/t miscommunication in her family. Pt reports during triage that Jairon Vasques has a lot of things going on, her granddaughter and and daughter don't speak to each other and she is stuck in between them causing her stress and anxiety. EMS was called by her granddaughter. Pt denies any CP, admits to SOB d/t anxiety and answering \"a lot of questions\" at this time. Pt denies any SI/HI at this time.
none

## (undated) DEVICE — MAJ-1414 SINGLE USE ADPATER BIOPSY VALV: Brand: SINGLE USE ADAPTOR BIOPSY VALVE

## (undated) DEVICE — REM POLYHESIVE ADULT PATIENT RETURN ELECTRODE: Brand: VALLEYLAB

## (undated) DEVICE — GARMENT,MEDLINE,DVT,INT,CALF,MED, GEN2: Brand: MEDLINE

## (undated) DEVICE — TUBING, SUCTION, 1/4" X 12', STRAIGHT: Brand: MEDLINE

## (undated) DEVICE — Device

## (undated) DEVICE — SINGLE PORT MANIFOLD: Brand: NEPTUNE 2

## (undated) DEVICE — SPONGE GZ W4XL4IN COT 12 PLY TYP VII WVN C FLD DSGN

## (undated) DEVICE — KENDALL RADIOLUCENT FOAM MONITORING ELECTRODE RECTANGULAR SHAPE: Brand: KENDALL

## (undated) DEVICE — MOUTHPIECE ENDOSCP 20X27MM --

## (undated) DEVICE — TRAP SPEC COLL POLYP POLYSTYR --